# Patient Record
Sex: FEMALE | Race: WHITE | NOT HISPANIC OR LATINO | Employment: FULL TIME | URBAN - METROPOLITAN AREA
[De-identification: names, ages, dates, MRNs, and addresses within clinical notes are randomized per-mention and may not be internally consistent; named-entity substitution may affect disease eponyms.]

---

## 2020-02-12 ENCOUNTER — OFFICE VISIT (OUTPATIENT)
Dept: URGENT CARE | Facility: CLINIC | Age: 30
End: 2020-02-12
Payer: COMMERCIAL

## 2020-02-12 VITALS
DIASTOLIC BLOOD PRESSURE: 67 MMHG | BODY MASS INDEX: 21.66 KG/M2 | WEIGHT: 122.25 LBS | HEART RATE: 96 BPM | TEMPERATURE: 102 F | SYSTOLIC BLOOD PRESSURE: 116 MMHG | HEIGHT: 63 IN | OXYGEN SATURATION: 97 %

## 2020-02-12 DIAGNOSIS — J98.8 BACTERIAL RESPIRATORY INFECTION: Primary | ICD-10-CM

## 2020-02-12 DIAGNOSIS — R50.9 FEVER, UNSPECIFIED FEVER CAUSE: ICD-10-CM

## 2020-02-12 DIAGNOSIS — R05.9 COUGH: ICD-10-CM

## 2020-02-12 DIAGNOSIS — B96.89 BACTERIAL RESPIRATORY INFECTION: Primary | ICD-10-CM

## 2020-02-12 LAB
CTP QC/QA: YES
FLUAV AG NPH QL: NEGATIVE
FLUBV AG NPH QL: NEGATIVE

## 2020-02-12 PROCEDURE — 87804 INFLUENZA ASSAY W/OPTIC: CPT | Mod: QW,S$GLB,, | Performed by: NURSE PRACTITIONER

## 2020-02-12 PROCEDURE — 87804 POCT INFLUENZA A/B: ICD-10-PCS | Mod: QW,S$GLB,, | Performed by: NURSE PRACTITIONER

## 2020-02-12 PROCEDURE — 99214 OFFICE O/P EST MOD 30 MIN: CPT | Mod: 25,S$GLB,, | Performed by: NURSE PRACTITIONER

## 2020-02-12 PROCEDURE — 99214 PR OFFICE/OUTPT VISIT, EST, LEVL IV, 30-39 MIN: ICD-10-PCS | Mod: 25,S$GLB,, | Performed by: NURSE PRACTITIONER

## 2020-02-12 RX ORDER — GUAIFENESIN 600 MG/1
600 TABLET, EXTENDED RELEASE ORAL 2 TIMES DAILY PRN
Qty: 14 TABLET | Refills: 0 | Status: SHIPPED | OUTPATIENT
Start: 2020-02-12 | End: 2020-02-19

## 2020-02-12 RX ORDER — PROMETHAZINE HYDROCHLORIDE AND DEXTROMETHORPHAN HYDROBROMIDE 6.25; 15 MG/5ML; MG/5ML
5 SYRUP ORAL NIGHTLY PRN
Qty: 50 ML | Refills: 0 | Status: SHIPPED | OUTPATIENT
Start: 2020-02-12 | End: 2020-02-22

## 2020-02-12 RX ORDER — LEVOFLOXACIN 750 MG/1
750 TABLET ORAL DAILY
Qty: 7 TABLET | Refills: 0 | Status: SHIPPED | OUTPATIENT
Start: 2020-02-12 | End: 2020-02-19

## 2020-02-12 RX ORDER — ACETAMINOPHEN 500 MG
1000 TABLET ORAL
Status: COMPLETED | OUTPATIENT
Start: 2020-02-12 | End: 2020-02-12

## 2020-02-12 RX ADMIN — Medication 1000 MG: at 05:02

## 2020-02-12 NOTE — LETTER
February 12, 2020      [facility]  [address]  [facility] [address]  Phone: [phone]       Patient: Deana Sanchez   YOB: 1990  Date of Visit: 02/12/2020    To Whom It May Concern:    Juan Carlos Sanchez  was at [facility] on 02/12/2020. She may return to work on 02/14/2020 with no restrictions. If you have any questions or concerns, or if I can be of further assistance, please do not hesitate to contact me.    Sincerely,    Yuliya Oneil, NP

## 2020-02-12 NOTE — PATIENT INSTRUCTIONS
Preventing Common Respiratory Infections  Respiratory infections such as colds and influenza (the flu) are common in winter. These infections are often caused by viruses. They may share some symptoms, but not all respiratory infections are the same. Some make you more sick than others. You can take steps to prevent common respiratory infections. And if you get sick, you can take care of yourself to keep the infection from getting worse.    What is a cold?  · Symptoms include runny nose, coughing and sneezing, and sore throat. Cold symptoms tend to be milder than flu symptoms.  · Symptoms tend to come on slowly. They last for a few days to about a week.  · With a cold, you can still do most of the things you usually do.  What is the flu?  · Symptoms include fever, headache, fatigue, cough, sore throat, runny nose, and muscle aches. Children may have upset stomach and vomiting, but adults usually dont.  · Symptoms tend to come on quickly. Some, such as fatigue and cough, can last a few weeks.  · With the flu, you may feel worn out and not able to do normal activities.  · Its most likely NOT the flu if an adult has vomiting or diarrhea for a day or two. This so-called stomach flu is probably a GI (gastrointestinal) infection.  When the infection gets worse  Without proper care, a respiratory infection can get worse. It can lead to serious complications and death. If you arent getting better, call your healthcare provider. Complications can include:  · Bronchitis (infection of the airways that leads to shortness of breath and coughing up thick yellow or green mucus)  · Pneumonia (infection of the lungs in which fluid and mucus settle in the lungs, making breathing difficult)  · Worsening of chronic conditions such as heart failure, chronic lung disease, asthma, or diabetes  · Severe dehydration (loss of fluids)  · Sinus problems  · Ear infections   Get a flu vaccine  A flu vaccine protects you from influenza  (but not other colds or infections). Get a vaccine each fall, before flu season starts. This can be done at a clinic, healthcare providers office, drugstore, senior center, or through your workplace.  Get pneumococcal vaccines  Pneumonia can be a complication of influenza. There are 2 pneumococcal pneumonia vaccines that protect against many types of pneumonia. Talk with your healthcare provider about these important vaccines.   Keep germs from spreading  No one likes getting sick. To protect yourself and others from cold and flu germs:  · Wash your hands often. Use alcohol-based hand  when you dont have access to soap and water.  · Dont touch your eyes, nose, and mouth. This may help you keep germs out of your body.  · Try to avoid people with respiratory infections. You may want to stay out of crowds during flu season (winter).  · Ask your healthcare provider if you should get a pneumonia vaccination.  How to wash your hands  · Use warm water and plenty of soap. Work up a good lather.  · Clean your whole hand, under your nails, between your fingers, and up your wrists. Wash for at least 15 to 20 seconds. Dont just wipe--rub well.  · Rinse. Let the water run down your fingertips, not up your wrists.  · In a public restroom, use a paper towel to turn off the faucet and open the door.   Date Last Reviewed: 12/1/2016  © 0847-0421 [employer]. [address] [place] [address], [place] [address]. All rights reserved. This information is not intended as a substitute for professional medical care. Always follow your healthcare professional's instructions.

## 2020-02-12 NOTE — PROGRESS NOTES
"Subjective:       Patient ID: Deana Sanchez is a 29 y.o. female.    Vitals:  height is 5' 3" (1.6 m) and weight is 55.4 kg (122 lb 3.9 oz). Her oral temperature is 101.7 °F (38.7 °C) (abnormal). Her blood pressure is 116/67 and her pulse is 96. Her oxygen saturation is 97%.     Chief Complaint: URI    URI    This is a new problem. The current episode started in the past 7 days. The problem has been gradually worsening. The maximum temperature recorded prior to her arrival was 102 - 102.9 F. The fever has been present for less than 1 day. Associated symptoms include congestion, coughing, a plugged ear sensation, sinus pain, a sore throat, swollen glands and wheezing. Pertinent negatives include no ear pain, nausea, rash or vomiting. She has tried NSAIDs for the symptoms. The treatment provided mild relief.       Constitution: Positive for chills, sweating, fatigue and fever.   HENT: Positive for congestion, sinus pain, sinus pressure, sore throat and voice change. Negative for ear pain.    Neck: Positive for painful lymph nodes.   Eyes: Negative for eye redness.   Respiratory: Positive for chest tightness, cough, sputum production, shortness of breath and wheezing. Negative for bloody sputum, COPD, stridor and asthma.    Gastrointestinal: Negative for nausea and vomiting.   Musculoskeletal: Positive for muscle ache.   Skin: Negative for rash.   Allergic/Immunologic: Negative for seasonal allergies and asthma.   Hematologic/Lymphatic: Positive for swollen lymph nodes.       Objective:      Physical Exam   Constitutional: She is oriented to person, place, and time. She appears well-developed and well-nourished. She is cooperative.  Non-toxic appearance. She does not have a sickly appearance. She does not appear ill. No distress.   HENT:   Head: Normocephalic and atraumatic.   Right Ear: Hearing, tympanic membrane, external ear and ear canal normal.   Left Ear: Hearing, tympanic membrane, external ear and ear " canal normal.   Nose: Nose normal. No mucosal edema, rhinorrhea or nasal deformity. No epistaxis. Right sinus exhibits no maxillary sinus tenderness and no frontal sinus tenderness. Left sinus exhibits no maxillary sinus tenderness and no frontal sinus tenderness.   Mouth/Throat: Uvula is midline and mucous membranes are normal. No trismus in the jaw. Normal dentition. No uvula swelling. Posterior oropharyngeal erythema (mild) present. No oropharyngeal exudate or posterior oropharyngeal edema.   Eyes: Conjunctivae and lids are normal. No scleral icterus.   Neck: Trachea normal, full passive range of motion without pain and phonation normal. Neck supple. No neck rigidity. No edema and no erythema present.   Cardiovascular: Normal rate, regular rhythm, normal heart sounds, intact distal pulses and normal pulses.   Pulmonary/Chest: Effort normal. No accessory muscle usage. No tachypnea and no bradypnea. No respiratory distress. She has no decreased breath sounds. She has rhonchi in the right upper field and the left upper field.   NP Cough, moderate congestion noted   Abdominal: Normal appearance.   Musculoskeletal: Normal range of motion. She exhibits no edema or deformity.   Lymphadenopathy:        Head (right side): No submandibular and no tonsillar adenopathy present.        Head (left side): No submandibular and no tonsillar adenopathy present.   Neurological: She is alert and oriented to person, place, and time. She exhibits normal muscle tone. Coordination normal.   Skin: Skin is warm, dry, intact, not diaphoretic, not pale and no rash. Capillary refill takes less than 2 seconds.   Psychiatric: She has a normal mood and affect. Her speech is normal and behavior is normal. Judgment and thought content normal. Cognition and memory are normal.   Nursing note and vitals reviewed.        Assessment:       1. Bacterial respiratory infection    2. Fever, unspecified fever cause    3. Cough        Plan:          Bacterial respiratory infection  -     levoFLOXacin (LEVAQUIN) 750 MG tablet; Take 1 tablet (750 mg total) by mouth once daily. for 7 days  Dispense: 7 tablet; Refill: 0  -     guaiFENesin (MUCINEX) 600 mg 12 hr tablet; Take 1 tablet (600 mg total) by mouth 2 (two) times daily as needed.  Dispense: 14 tablet; Refill: 0  -     promethazine-dextromethorphan (PROMETHAZINE-DM) 6.25-15 mg/5 mL Syrp; Take 5 mLs by mouth nightly as needed.  Dispense: 50 mL; Refill: 0    Fever, unspecified fever cause  -     POCT Influenza A/B  -     acetaminophen tablet 1,000 mg    Cough  -     promethazine-dextromethorphan (PROMETHAZINE-DM) 6.25-15 mg/5 mL Syrp; Take 5 mLs by mouth nightly as needed.  Dispense: 50 mL; Refill: 0         Results for orders placed or performed in visit on 02/12/20   POCT Influenza A/B   Result Value Ref Range    Rapid Influenza A Ag Negative Negative    Rapid Influenza B Ag Negative Negative     Acceptable Yes        Take antibiotics for entire course.  Do not save medications for later, all medications must be taken for full regimen.  · Getting plenty of rest is very important to fighting infections.  · Increase fluids.   · May apply warm compresses as needed for facial pain and congestion.   · Saline nasal spray to loosen nasal congestion.  · Flonase or Nasacort to reduce inflammation in the sinus cavities.  · You may take an over the counter antihistamine for allergy symptoms such as sneezing, itchy/watery eyes, scratchy throat, or congestion.  · Take Tylenol or Ibuprofen as needed for sore throat, body aches, or fever.  · Don't drive, drink alcohol, or take any other medication or substance that causes sedation while taking cough syrup.   · Follow up with your primary care provider if symptoms persist >10 days or sooner for any new or worsening symptoms.   Go to the ER for any fever that does not improve with Tylenol/Ibuprofen, neck stiffness, rash, severe headache, vision changes,  shortness of breath, chest pain, facial swelling, severe facial pain, or any other new and concerning symptoms.

## 2020-02-16 ENCOUNTER — TELEPHONE (OUTPATIENT)
Dept: URGENT CARE | Facility: CLINIC | Age: 30
End: 2020-02-16

## 2021-01-29 ENCOUNTER — OFFICE VISIT (OUTPATIENT)
Dept: URGENT CARE | Facility: CLINIC | Age: 31
End: 2021-01-29
Payer: COMMERCIAL

## 2021-01-29 VITALS
HEART RATE: 80 BPM | OXYGEN SATURATION: 98 % | TEMPERATURE: 98 F | DIASTOLIC BLOOD PRESSURE: 74 MMHG | BODY MASS INDEX: 21.34 KG/M2 | HEIGHT: 64 IN | SYSTOLIC BLOOD PRESSURE: 113 MMHG | WEIGHT: 125 LBS | RESPIRATION RATE: 18 BRPM

## 2021-01-29 DIAGNOSIS — R10.9 ABDOMINAL PAIN, UNSPECIFIED ABDOMINAL LOCATION: ICD-10-CM

## 2021-01-29 DIAGNOSIS — N94.6 DYSMENORRHEA: Primary | ICD-10-CM

## 2021-01-29 LAB
B-HCG UR QL: NEGATIVE
BILIRUB UR QL STRIP: NEGATIVE
CTP QC/QA: YES
GLUCOSE UR QL STRIP: NEGATIVE
KETONES UR QL STRIP: NEGATIVE
LEUKOCYTE ESTERASE UR QL STRIP: NEGATIVE
PH, POC UA: 5
POC BLOOD, URINE: POSITIVE
POC NITRATES, URINE: NEGATIVE
PROT UR QL STRIP: NEGATIVE
SP GR UR STRIP: 1.02 (ref 1–1.03)
UROBILINOGEN UR STRIP-ACNC: ABNORMAL (ref 0.1–1.1)

## 2021-01-29 PROCEDURE — 81003 POCT URINALYSIS, DIPSTICK, AUTOMATED, W/O SCOPE: ICD-10-PCS | Mod: QW,S$GLB,, | Performed by: EMERGENCY MEDICINE

## 2021-01-29 PROCEDURE — 99213 OFFICE O/P EST LOW 20 MIN: CPT | Mod: 25,S$GLB,, | Performed by: EMERGENCY MEDICINE

## 2021-01-29 PROCEDURE — 99213 PR OFFICE/OUTPT VISIT, EST, LEVL III, 20-29 MIN: ICD-10-PCS | Mod: 25,S$GLB,, | Performed by: EMERGENCY MEDICINE

## 2021-01-29 PROCEDURE — 3008F BODY MASS INDEX DOCD: CPT | Mod: CPTII,S$GLB,, | Performed by: EMERGENCY MEDICINE

## 2021-01-29 PROCEDURE — 3008F PR BODY MASS INDEX (BMI) DOCUMENTED: ICD-10-PCS | Mod: CPTII,S$GLB,, | Performed by: EMERGENCY MEDICINE

## 2021-01-29 PROCEDURE — 81003 URINALYSIS AUTO W/O SCOPE: CPT | Mod: QW,S$GLB,, | Performed by: EMERGENCY MEDICINE

## 2021-03-17 ENCOUNTER — OFFICE VISIT (OUTPATIENT)
Dept: URGENT CARE | Facility: CLINIC | Age: 31
End: 2021-03-17
Payer: COMMERCIAL

## 2021-03-17 VITALS
RESPIRATION RATE: 18 BRPM | TEMPERATURE: 98 F | DIASTOLIC BLOOD PRESSURE: 66 MMHG | HEART RATE: 105 BPM | OXYGEN SATURATION: 99 % | SYSTOLIC BLOOD PRESSURE: 105 MMHG

## 2021-03-17 DIAGNOSIS — J06.9 UPPER RESPIRATORY TRACT INFECTION, UNSPECIFIED TYPE: Primary | ICD-10-CM

## 2021-03-17 LAB
CTP QC/QA: YES
SARS-COV-2 RDRP RESP QL NAA+PROBE: NEGATIVE

## 2021-03-17 PROCEDURE — 99213 PR OFFICE/OUTPT VISIT, EST, LEVL III, 20-29 MIN: ICD-10-PCS | Mod: S$GLB,,, | Performed by: PHYSICIAN ASSISTANT

## 2021-03-17 PROCEDURE — U0002: ICD-10-PCS | Mod: QW,S$GLB,, | Performed by: PHYSICIAN ASSISTANT

## 2021-03-17 PROCEDURE — U0002 COVID-19 LAB TEST NON-CDC: HCPCS | Mod: QW,S$GLB,, | Performed by: PHYSICIAN ASSISTANT

## 2021-03-17 PROCEDURE — 99213 OFFICE O/P EST LOW 20 MIN: CPT | Mod: S$GLB,,, | Performed by: PHYSICIAN ASSISTANT

## 2021-03-20 ENCOUNTER — TELEPHONE (OUTPATIENT)
Dept: URGENT CARE | Facility: CLINIC | Age: 31
End: 2021-03-20

## 2021-04-28 ENCOUNTER — PATIENT MESSAGE (OUTPATIENT)
Dept: RESEARCH | Facility: HOSPITAL | Age: 31
End: 2021-04-28

## 2022-03-02 ENCOUNTER — OFFICE VISIT (OUTPATIENT)
Dept: URGENT CARE | Facility: CLINIC | Age: 32
End: 2022-03-02
Payer: COMMERCIAL

## 2022-03-02 VITALS
RESPIRATION RATE: 18 BRPM | OXYGEN SATURATION: 100 % | WEIGHT: 116 LBS | BODY MASS INDEX: 20.55 KG/M2 | HEIGHT: 63 IN | DIASTOLIC BLOOD PRESSURE: 63 MMHG | HEART RATE: 104 BPM | TEMPERATURE: 99 F | SYSTOLIC BLOOD PRESSURE: 105 MMHG

## 2022-03-02 DIAGNOSIS — R09.81 NASAL CONGESTION: ICD-10-CM

## 2022-03-02 DIAGNOSIS — J30.9 ALLERGIC RHINITIS, UNSPECIFIED SEASONALITY, UNSPECIFIED TRIGGER: Primary | ICD-10-CM

## 2022-03-02 LAB
CTP QC/QA: YES
SARS-COV-2 RDRP RESP QL NAA+PROBE: NEGATIVE

## 2022-03-02 PROCEDURE — 1160F RVW MEDS BY RX/DR IN RCRD: CPT | Mod: CPTII,S$GLB,, | Performed by: EMERGENCY MEDICINE

## 2022-03-02 PROCEDURE — 1160F PR REVIEW ALL MEDS BY PRESCRIBER/CLIN PHARMACIST DOCUMENTED: ICD-10-PCS | Mod: CPTII,S$GLB,, | Performed by: EMERGENCY MEDICINE

## 2022-03-02 PROCEDURE — 3078F PR MOST RECENT DIASTOLIC BLOOD PRESSURE < 80 MM HG: ICD-10-PCS | Mod: CPTII,S$GLB,, | Performed by: EMERGENCY MEDICINE

## 2022-03-02 PROCEDURE — 1159F PR MEDICATION LIST DOCUMENTED IN MEDICAL RECORD: ICD-10-PCS | Mod: CPTII,S$GLB,, | Performed by: EMERGENCY MEDICINE

## 2022-03-02 PROCEDURE — 3074F SYST BP LT 130 MM HG: CPT | Mod: CPTII,S$GLB,, | Performed by: EMERGENCY MEDICINE

## 2022-03-02 PROCEDURE — 3008F BODY MASS INDEX DOCD: CPT | Mod: CPTII,S$GLB,, | Performed by: EMERGENCY MEDICINE

## 2022-03-02 PROCEDURE — 3074F PR MOST RECENT SYSTOLIC BLOOD PRESSURE < 130 MM HG: ICD-10-PCS | Mod: CPTII,S$GLB,, | Performed by: EMERGENCY MEDICINE

## 2022-03-02 PROCEDURE — 3008F PR BODY MASS INDEX (BMI) DOCUMENTED: ICD-10-PCS | Mod: CPTII,S$GLB,, | Performed by: EMERGENCY MEDICINE

## 2022-03-02 PROCEDURE — 99212 OFFICE O/P EST SF 10 MIN: CPT | Mod: S$GLB,,, | Performed by: EMERGENCY MEDICINE

## 2022-03-02 PROCEDURE — 3078F DIAST BP <80 MM HG: CPT | Mod: CPTII,S$GLB,, | Performed by: EMERGENCY MEDICINE

## 2022-03-02 PROCEDURE — U0002 COVID-19 LAB TEST NON-CDC: HCPCS | Mod: QW,S$GLB,, | Performed by: EMERGENCY MEDICINE

## 2022-03-02 PROCEDURE — U0002: ICD-10-PCS | Mod: QW,S$GLB,, | Performed by: EMERGENCY MEDICINE

## 2022-03-02 PROCEDURE — 1159F MED LIST DOCD IN RCRD: CPT | Mod: CPTII,S$GLB,, | Performed by: EMERGENCY MEDICINE

## 2022-03-02 PROCEDURE — 99212 PR OFFICE/OUTPT VISIT, EST, LEVL II, 10-19 MIN: ICD-10-PCS | Mod: S$GLB,,, | Performed by: EMERGENCY MEDICINE

## 2022-03-02 RX ORDER — FLUTICASONE PROPIONATE 50 MCG
2 SPRAY, SUSPENSION (ML) NASAL DAILY
Qty: 16 G | Refills: 0 | Status: SHIPPED | OUTPATIENT
Start: 2022-03-02

## 2022-03-02 NOTE — LETTER
March 2, 2022      [facility]  [address]  [place] [address]  Phone: [phone]       Patient: Deana Sanchez   YOB: 1990  Date of Visit: 03/02/2022    To Whom It May Concern:    Juan Carlos Sanchez  was at [facility] on 03/02/2022. The patient may return to work/school on 03/03/2022 with no restrictions. If you have any questions or concerns, or if I can be of further assistance, please do not hesitate to contact me.    Sincerely,      Kiana Dobbins MD

## 2022-03-02 NOTE — PROGRESS NOTES
"Subjective:       Patient ID: Deana Sanchez is a 31 y.o. female.    Vitals:  height is 5' 3" (1.6 m) and weight is 52.6 kg (116 lb). Her temperature is 98.5 °F (36.9 °C). Her blood pressure is 105/63 and her pulse is 104. Her respiration is 18 and oxygen saturation is 100%.     Chief Complaint: Nasal Congestion    Pt presents today for nasal congestion and sore throat that started last night. Pt took Mucinex but it doesn't relieve symptoms. Pt denies exposure to any illness.  No fever chills.  Did cut the grass yesterday and thinks this might have precipitated it.    URI   This is a new problem. The current episode started yesterday. The problem has been gradually worsening. There has been no fever. Associated symptoms include congestion, a plugged ear sensation, rhinorrhea, sneezing, a sore throat and swollen glands. Pertinent negatives include no abdominal pain, chest pain, coughing, diarrhea, dysuria, ear pain, headaches, joint pain, joint swelling, nausea, neck pain, rash, sinus pain, vomiting or wheezing. Treatments tried: mucinex. The treatment provided no relief.       HENT: Positive for congestion and sore throat. Negative for ear pain and sinus pain.    Neck: Negative for neck pain.   Cardiovascular: Negative for chest pain.   Respiratory: Negative for cough and wheezing.    Gastrointestinal: Negative for abdominal pain, nausea, vomiting and diarrhea.   Genitourinary: Negative for dysuria.   Skin: Negative for rash.   Allergic/Immunologic: Positive for sneezing.   Neurological: Negative for headaches.       Objective:      Physical Exam   Constitutional: She is oriented to person, place, and time. She appears well-developed. She is cooperative.  Non-toxic appearance. She does not appear ill. No distress.   HENT:   Head: Normocephalic and atraumatic.   Ears:   Right Ear: Hearing and external ear normal.   Left Ear: Hearing and external ear normal.   Nose: Congestion present. No mucosal edema, " rhinorrhea or nasal deformity. No epistaxis. Right sinus exhibits no maxillary sinus tenderness and no frontal sinus tenderness. Left sinus exhibits no maxillary sinus tenderness and no frontal sinus tenderness.   Mouth/Throat: Uvula is midline and mucous membranes are normal. No trismus in the jaw. Normal dentition. No uvula swelling. Posterior oropharyngeal erythema present. No oropharyngeal exudate or posterior oropharyngeal edema.   Eyes: Conjunctivae and lids are normal. No scleral icterus.      extraocular movement intact   Neck: Trachea normal and phonation normal. Neck supple. No edema present. No erythema present. No neck rigidity present.   Cardiovascular: Normal rate, regular rhythm, normal heart sounds and normal pulses.   Pulmonary/Chest: Effort normal and breath sounds normal. No respiratory distress. She has no decreased breath sounds. She has no rhonchi.   Abdominal: Normal appearance.   Musculoskeletal: Normal range of motion.         General: No deformity. Normal range of motion.   Neurological: She is alert and oriented to person, place, and time. She exhibits normal muscle tone. Coordination normal.   Skin: Skin is warm, dry, intact, not diaphoretic and not pale.   Psychiatric: Her speech is normal and behavior is normal. Judgment and thought content normal.   Nursing note and vitals reviewed.        Assessment:       1. Allergic rhinitis, unspecified seasonality, unspecified trigger    2. Nasal congestion        Results for orders placed or performed in visit on 03/02/22   POCT COVID-19 Rapid Screening   Result Value Ref Range    POC Rapid COVID Negative Negative     Acceptable Yes        Plan:         Allergic rhinitis, unspecified seasonality, unspecified trigger  -     fluticasone propionate (FLONASE) 50 mcg/actuation nasal spray; 2 sprays (100 mcg total) by Each Nostril route once daily.  Dispense: 16 g; Refill: 0    Nasal congestion  -     POCT COVID-19 Rapid Screening

## 2022-03-02 NOTE — PATIENT INSTRUCTIONS
Use over the counter(OTC) antihistamine like claritin or zyrtec daily.  Flonase: 2 sprays per nostril 1-2 times a day.   Nasal saline drops: 2-4 drops per nostril 10-15 times a day.     Tylenol or Motrin for fever or pain per label instructions.  Consider use of OTC cough medicine like Robitussin DM.  Avoid OTC decongestants if you have high blood pressure. This includes multi-cold symptom preparations.    Follow up in 2-3 days with PCP if no improvement or any worsening.     Seasonal Allergies Discharge Instructions   About this topic   Seasonal allergies are also called allergic rhinitis or hay fever. You may have sneezing; a stuffy or runny nose; or itchy throat, ears, or eyes. You may feel very tired. Most often, this problem is caused by:  Pollens from trees, grasses, or weeds.  Mold spores that grow when the air is humid, wet, or damp.  Some people can breathe in these things and have no problems. But when you have a seasonal allergy, your body acts as if the substance is harming you. Then you have symptoms. You may have symptoms only at some times of the year. If your symptoms last all year, they may be caused by:  Insects, such as dust mites or cock roaches.  Animals, such as cats or dogs.  Mold spores.     What care is needed at home?   Ask your doctor what you need to do when you go home. Make sure you ask questions if you do not understand what the doctor says.  Rinse your nose with salt water to get rid of pollen inside of your nose.  Keep the windows closed and use air conditioning.  Shower before bed to rinse pollen from your hair and skin.  Wear a dust mask if you need to be outside.  Use over-the-counter medicines to help with your symptoms:  A steroid nose spray can help with a stuffy nose. It can also help with drainage down the back of your throat.  An antihistamine can help with itching, sneezing, or runny nose.  An antihistamine eye drop can help with itchy eyes.  A decongestant can help with a  stuffy nose. Talk with your doctor or pharmacist about your other health problems before you use this kind of medicine. It may not be safe for people with high blood pressure.  What follow-up care is needed?   Your doctor may ask you to make visits to the office to check on your progress. Your doctor may ask you to see an allergy specialist, or to have testing done to learn what is causing your allergies. Be sure to keep these visits.  What drugs may be needed?   The doctor may order drugs to treat the signs. Take your drugs as ordered. You may also take allergy shots if you have had allergy tests.  Will physical activity be limited?   You may have to limit your activity. If your health problem is caused by an allergy to pollens or molds, you may want to limit your time outdoors. Talk to your doctor about what activities are best for you.  What problems could happen?   Your signs may not get better with your drugs  Asthma may get worse  Sinus infection  What can be done to prevent this health problem?   Try to avoid allergens.  If you are allergic to pollens, grasses, trees, etc:  Stay inside in the morning when pollen counts are high.  Avoid going outside when the trees, grasses, or weeds are blooming.  Keep the windows of your house and car closed.  Use an air conditioner.  If you are allergic to dust, molds, dust mites, pets, etc:  Clean your air conditioner or furnace filters regularly.  Cover pillows and mattresses with vinyl covers to lower your exposure to dust mites.  Wash bedding weekly in very hot water.  Use fewer dust-collecting items, such as curtains, bed skirts, carpeting, and stuffed animals, mainly in your bedroom.  If you can't avoid having a furry pet, vacuum often and keep your pet out of bedrooms and other rooms with carpets.  When do I need to call the doctor?   You are having so much trouble breathing that you can only say one or two words at a time.  You need to sit upright at all times to be  able to breathe and or cannot lie down.  You have severe swelling of your tongue, lips, or mouth.  You have trouble breathing when talking or sitting still.  You have a fever of 100.4°F (38°C) or higher.  You have green or yellow mucus.  Teach Back: Helping You Understand   The Teach Back Method helps you understand the information we are giving you. After you talk with the staff, tell them in your own words what you learned. This helps to make sure the staff has described each thing clearly. It also helps to explain things that may have been confusing. Before going home, make sure you can do these:  I can tell you about my condition.  I can tell you what may help make the air .  I can tell you what may help ease my allergies.  Where can I learn more?   Food and Drug Administration  https://www.fda.gov/ForConsumers/ConsumerUpdates/zmi151675.htm   NHS Choices  https://www.nhs.uk/conditions/Hay-fever/   Last Reviewed Date   2021-06-21  Consumer Information Use and Disclaimer   This information is not specific medical advice and does not replace information you receive from your health care provider. This is only a brief summary of general information. It does NOT include all information about conditions, illnesses, injuries, tests, procedures, treatments, therapies, discharge instructions or life-style choices that may apply to you. You must talk with your health care provider for complete information about your health and treatment options. This information should not be used to decide whether or not to accept your health care providers advice, instructions or recommendations. Only your health care provider has the knowledge and training to provide advice that is right for you.  Copyright   Copyright © 2021 [employer] and its affiliates and/or licensors. All rights reserved.

## 2022-03-05 ENCOUNTER — TELEPHONE (OUTPATIENT)
Dept: URGENT CARE | Facility: CLINIC | Age: 32
End: 2022-03-05
Payer: COMMERCIAL

## 2022-10-07 ENCOUNTER — TELEPHONE (OUTPATIENT)
Dept: HEMATOLOGY/ONCOLOGY | Facility: CLINIC | Age: 32
End: 2022-10-07
Payer: COMMERCIAL

## 2022-10-13 ENCOUNTER — TELEPHONE (OUTPATIENT)
Dept: HEMATOLOGY/ONCOLOGY | Facility: CLINIC | Age: 32
End: 2022-10-13
Payer: COMMERCIAL

## 2022-10-13 NOTE — NURSING
1620pm: Outgoing call to pt regarding incorrect appt scheduled via my [facility] portal per pt. Spoke with pt. Explained to pt why appt was cx'd and needs to be rescheduled. Pt scheduled for new pt appt on 11/1 at 7 am at [facility]. Appt date and time per pt request. Pt given location instructions. I confirmed appt type [facility] via phone prior to scheduling. Pt verbalized understanding. Pt plan to report to appt as scheduled.

## 2022-10-18 NOTE — PROGRESS NOTES
Patient ID: Deana Sanchez is a 32 y.o. female.    Chief Complaint: jonathan breast masses    HPI: patient presents for f/u evaluation of bilateral breast masses- last visit was 6/21/16    Onset- bilateral masses for years- thinks the one one on the left has increased in size over the past year - not sure if a new one or the same and is especially painful prior to cycle  Past history of lumps- for years jonathan solid masses    Last imaging- 6/2016    Risk factors identified:     Menarche at 16  G 0 P 0    LMP: 10/10/2022  Estrogen: none  Radiation to the neck or chest wall- none  Prior breast biopsies or atypical hyperplasia- none    ETOH: monthly     FH: paternal aunt breast cancer at 63 y/o      Body mass index is 20.74 kg/m².    Review of Systems   Constitutional: Negative.    HENT: Negative.     Eyes: Negative.    Respiratory: Negative.     Cardiovascular: Negative.    Gastrointestinal: Negative.         No reflux   Endocrine: Negative.    Genitourinary: Negative.    Musculoskeletal: Negative.    Skin: Negative.    Allergic/Immunologic: Negative.    Neurological: Negative.    Hematological: Negative.  Negative for adenopathy.   Psychiatric/Behavioral: Negative.     Breast: Pt states has noted right breast mass increasing in size and more sensitive around cycles. No nipple discharge. Bilateral breast masses as noted in HPI. No prior trauma or bruising. No breast surgeries or abnormalities.    Current Outpatient Medications   Medication Sig Dispense Refill    fluticasone propionate (FLONASE) 50 mcg/actuation nasal spray 2 sprays (100 mcg total) by Each Nostril route once daily. 16 g 0     No current facility-administered medications for this visit.       Review of patient's allergies indicates:   Allergen Reactions    Morphine Other (See Comments)     aggression       No past medical history on file.    Past Surgical History:   Procedure Laterality Date    ANKLE SURGERY      KNEE SURGERY      MANDIBLE SURGERY          No family history on file.    Social History     Socioeconomic History    Marital status: Single   Tobacco Use    Smoking status: Some Days     Packs/day: 0.50     Types: Cigarettes    Smokeless tobacco: Never    Tobacco comments:     working on quitting   Substance and Sexual Activity    Alcohol use: Yes     Alcohol/week: 1.0 standard drink     Types: 1 Standard drinks or equivalent per week    Drug use: No       Vitals:    11/01/22 0717   BP: 114/76   Pulse: 93       Physical Exam  Constitutional:       Appearance: She is well-developed.   HENT:      Head: Normocephalic and atraumatic.      Right Ear: External ear normal.      Left Ear: External ear normal.      Mouth/Throat:      Pharynx: No oropharyngeal exudate.   Eyes:      General: No scleral icterus.        Right eye: No discharge.         Left eye: No discharge.      Conjunctiva/sclera: Conjunctivae normal.      Pupils: Pupils are equal, round, and reactive to light.   Neck:      Thyroid: No thyromegaly.   Cardiovascular:      Rate and Rhythm: Normal rate and regular rhythm.      Heart sounds: Normal heart sounds.   Pulmonary:      Effort: Pulmonary effort is normal.      Breath sounds: Normal breath sounds.   Chest:   Breasts:     Right: No inverted nipple, mass, nipple discharge, skin change or tenderness.      Left: No inverted nipple, mass, nipple discharge, skin change or tenderness.       Abdominal:      General: Bowel sounds are normal.      Palpations: Abdomen is soft.   Musculoskeletal:         General: Normal range of motion.      Right shoulder: No crepitus. Normal strength.      Cervical back: Normal range of motion and neck supple.   Lymphadenopathy:      Head:      Right side of head: No submental, submandibular, tonsillar, preauricular, posterior auricular or occipital adenopathy.      Left side of head: No submental, submandibular, tonsillar, preauricular, posterior auricular or occipital adenopathy.      Cervical: No cervical  adenopathy.      Right cervical: No superficial or posterior cervical adenopathy.     Left cervical: No superficial or posterior cervical adenopathy.      Upper Body:      Right upper body: No supraclavicular or axillary adenopathy.      Left upper body: No supraclavicular or axillary adenopathy.   Skin:     General: Skin is warm and dry.      Coloration: Skin is not pale.      Findings: No erythema or rash.   Neurological:      Mental Status: She is alert and oriented to person, place, and time.      Deep Tendon Reflexes: Reflexes are normal and symmetric.   Psychiatric:         Behavior: Behavior normal.         Thought Content: Thought content normal.         Judgment: Judgment normal.         6/14/2016  US Breast Bilateral Limited  Films Compared   The present examination has been compared to prior imaging studies   performed at [facility] on 12/08/2015 and 12/17/2015.  BILATERAL LIMITED ULTRASOUND FINDINGS:   Finding 1:  There is a solid mass measuring 16 millimeters seen in the left   breast at 4 o'clock. Ultrasound is suggestive of a 4 o'clock.  2 similar   appearing findings at 6 o'clock and 4 o'clock measuring 6 mm in diameter   which are also probably fibroadenomas.     Finding 2:  There are hyperechoic areas seen in the sub-areolar region of   the right breast. Ultrasound is suggestive of two solid masses.  They   measure 24 x 6 x 12 mm and 13 x 3 x 8 mm. Also probably fibroadenomas.  Impression   Finding 1:  Stable 4 o'clock in the left breast is probably benign.   Follow-up in 6 months is recommended.     Finding 2:  Stable solid masses in the right breast are probably benign.   Follow-up in 6 months is recommended.    ARTEMIO NEWMAN  06/14/2016  US Breast Left Limited  LEFT LIMITED ULTRASOUND FINDINGS:   High-resolution real-time ultrasound scanning was performed.     There is a hypoechoic area seen in the left breast at 4 o'clock. Ultrasound   is suggestive of a solid mass.  Probable  fibroadenoma.  There is an   adjacent simple cyst at 4:00.  Impression   Solid mass in the left breast is probably benign. Follow-up in 6 months is   recommended. Any decision to biopsy should be made on a clinical basis.    LULU CHILDERS., TJ  12/17/2015  US Breast Right Limited  Films Compared   No prior imaging studies are available for comparison.  RIGHT LIMITED ULTRASOUND FINDINGS:   There are hypoechoic areas seen in the sub-areolar region of the right   breast. Ultrasound is suggestive of two solid masses.  24mm and 11mm in   diameter.     Appearance would be consistent with fibroadenomata.  Impression   Solid masses in the right breast are probably benign. Follow-up in 6 months   is recommended.    ARTEMIO NEWMAN  12/08/2015       Assessment & Plan:  Mass of breast, unspecified laterality  -     Mammo Digital Diagnostic Bilat with Aman; Future; Expected date: 12/01/2022  -     US Breast Bilateral Limited; Future; Expected date: 12/01/2022    Encounter for breast cancer screening using non-mammogram modality    Health education/counseling    Counseling and coordination of care    Counseling on health promotion and disease prevention    clinical findings on exam- right breast palpable mass- new- left breast stable findings  imaging- last done 6/2016- over due for f/u- will schedule jonathan diagnostic mammo and jonathan ultrasound now to reassess the bilateral breast masses  If new mass and over 3 cm right breast pt would like to have removed due to size and pain. Would need an ultrasound guided biopsy and then excision with Surgeon.   Will forward pt results and recommendations via [employer] pt agrees- schedule dfor 11/10/2022 at 2 pm

## 2022-11-01 ENCOUNTER — OFFICE VISIT (OUTPATIENT)
Dept: SURGERY | Facility: CLINIC | Age: 32
End: 2022-11-01
Payer: COMMERCIAL

## 2022-11-01 VITALS
HEART RATE: 93 BPM | WEIGHT: 117.06 LBS | BODY MASS INDEX: 20.74 KG/M2 | SYSTOLIC BLOOD PRESSURE: 114 MMHG | DIASTOLIC BLOOD PRESSURE: 76 MMHG

## 2022-11-01 DIAGNOSIS — Z12.39 ENCOUNTER FOR BREAST CANCER SCREENING USING NON-MAMMOGRAM MODALITY: ICD-10-CM

## 2022-11-01 DIAGNOSIS — N63.0 MASS OF BREAST, UNSPECIFIED LATERALITY: Primary | ICD-10-CM

## 2022-11-01 DIAGNOSIS — Z71.89 COUNSELING AND COORDINATION OF CARE: ICD-10-CM

## 2022-11-01 DIAGNOSIS — Z71.89 COUNSELING ON HEALTH PROMOTION AND DISEASE PREVENTION: ICD-10-CM

## 2022-11-01 DIAGNOSIS — Z71.9 HEALTH EDUCATION/COUNSELING: ICD-10-CM

## 2022-11-01 PROCEDURE — 1160F RVW MEDS BY RX/DR IN RCRD: CPT | Mod: CPTII,S$GLB,, | Performed by: NURSE PRACTITIONER

## 2022-11-01 PROCEDURE — 3074F SYST BP LT 130 MM HG: CPT | Mod: CPTII,S$GLB,, | Performed by: NURSE PRACTITIONER

## 2022-11-01 PROCEDURE — 3078F DIAST BP <80 MM HG: CPT | Mod: CPTII,S$GLB,, | Performed by: NURSE PRACTITIONER

## 2022-11-01 PROCEDURE — 99999 PR PBB SHADOW E&M-EST. PATIENT-LVL III: CPT | Mod: PBBFAC,,, | Performed by: NURSE PRACTITIONER

## 2022-11-01 PROCEDURE — 1159F PR MEDICATION LIST DOCUMENTED IN MEDICAL RECORD: ICD-10-PCS | Mod: CPTII,S$GLB,, | Performed by: NURSE PRACTITIONER

## 2022-11-01 PROCEDURE — 3074F PR MOST RECENT SYSTOLIC BLOOD PRESSURE < 130 MM HG: ICD-10-PCS | Mod: CPTII,S$GLB,, | Performed by: NURSE PRACTITIONER

## 2022-11-01 PROCEDURE — 99999 PR PBB SHADOW E&M-EST. PATIENT-LVL III: ICD-10-PCS | Mod: PBBFAC,,, | Performed by: NURSE PRACTITIONER

## 2022-11-01 PROCEDURE — 1159F MED LIST DOCD IN RCRD: CPT | Mod: CPTII,S$GLB,, | Performed by: NURSE PRACTITIONER

## 2022-11-01 PROCEDURE — 3078F PR MOST RECENT DIASTOLIC BLOOD PRESSURE < 80 MM HG: ICD-10-PCS | Mod: CPTII,S$GLB,, | Performed by: NURSE PRACTITIONER

## 2022-11-01 PROCEDURE — 99203 PR OFFICE/OUTPT VISIT, NEW, LEVL III, 30-44 MIN: ICD-10-PCS | Mod: S$GLB,,, | Performed by: NURSE PRACTITIONER

## 2022-11-01 PROCEDURE — 1160F PR REVIEW ALL MEDS BY PRESCRIBER/CLIN PHARMACIST DOCUMENTED: ICD-10-PCS | Mod: CPTII,S$GLB,, | Performed by: NURSE PRACTITIONER

## 2022-11-01 PROCEDURE — 99203 OFFICE O/P NEW LOW 30 MIN: CPT | Mod: S$GLB,,, | Performed by: NURSE PRACTITIONER

## 2022-11-10 ENCOUNTER — HOSPITAL ENCOUNTER (OUTPATIENT)
Dept: RADIOLOGY | Facility: HOSPITAL | Age: 32
Discharge: HOME | End: 2022-11-10
Attending: NURSE PRACTITIONER
Payer: COMMERCIAL

## 2022-11-10 VITALS — HEIGHT: 63 IN | BODY MASS INDEX: 20.74 KG/M2

## 2022-11-10 DIAGNOSIS — R92.8 ABNORMAL MAMMOGRAM: Primary | ICD-10-CM

## 2022-11-10 DIAGNOSIS — N63.0 MASS OF BREAST, UNSPECIFIED LATERALITY: ICD-10-CM

## 2022-11-10 DIAGNOSIS — N63.11 MASS OF UPPER OUTER QUADRANT OF RIGHT BREAST: ICD-10-CM

## 2022-11-10 PROCEDURE — 76642 US BREAST BILATERAL LIMITED: ICD-10-PCS | Mod: 26,,, | Performed by: RADIOLOGY

## 2022-11-10 PROCEDURE — 77062 BREAST TOMOSYNTHESIS BI: CPT | Mod: 26,,, | Performed by: RADIOLOGY

## 2022-11-10 PROCEDURE — 77066 MAMMO DIGITAL DIAGNOSTIC BILAT WITH TOMO: ICD-10-PCS | Mod: 26,,, | Performed by: RADIOLOGY

## 2022-11-10 PROCEDURE — 77062 MAMMO DIGITAL DIAGNOSTIC BILAT WITH TOMO: ICD-10-PCS | Mod: 26,,, | Performed by: RADIOLOGY

## 2022-11-10 PROCEDURE — 77066 DX MAMMO INCL CAD BI: CPT | Mod: 26,,, | Performed by: RADIOLOGY

## 2022-11-10 PROCEDURE — 76642 ULTRASOUND BREAST LIMITED: CPT | Mod: TC,50

## 2022-11-10 PROCEDURE — 76642 ULTRASOUND BREAST LIMITED: CPT | Mod: 26,,, | Performed by: RADIOLOGY

## 2022-11-10 PROCEDURE — 77062 BREAST TOMOSYNTHESIS BI: CPT | Mod: TC

## 2022-11-11 ENCOUNTER — TELEPHONE (OUTPATIENT)
Dept: RADIOLOGY | Facility: HOSPITAL | Age: 32
End: 2022-11-11
Payer: COMMERCIAL

## 2022-11-11 NOTE — TELEPHONE ENCOUNTER
Ultrasound guided biopsy scheduled for November 22 at 10am, arrival time 9:30, at the [place], biopsy instruction given with understandings verbalized. Patient has my contact information.

## 2022-11-22 ENCOUNTER — HOSPITAL ENCOUNTER (OUTPATIENT)
Dept: RADIOLOGY | Facility: HOSPITAL | Age: 32
Discharge: HOME | End: 2022-11-22
Attending: NURSE PRACTITIONER
Payer: COMMERCIAL

## 2022-11-22 DIAGNOSIS — N63.11 MASS OF UPPER OUTER QUADRANT OF RIGHT BREAST: ICD-10-CM

## 2022-11-22 DIAGNOSIS — R92.8 ABNORMAL MAMMOGRAM: ICD-10-CM

## 2022-11-22 PROCEDURE — 88305 TISSUE EXAM BY PATHOLOGIST: CPT | Performed by: PATHOLOGY

## 2022-11-22 PROCEDURE — A4550 SURGICAL TRAYS: HCPCS

## 2022-11-22 PROCEDURE — 88305 TISSUE EXAM BY PATHOLOGIST: CPT | Mod: 26,,, | Performed by: PATHOLOGY

## 2022-11-22 PROCEDURE — 77065 DX MAMMO INCL CAD UNI: CPT | Mod: TC,RT

## 2022-11-22 PROCEDURE — 77065 DX MAMMO INCL CAD UNI: CPT | Mod: 26,RT,, | Performed by: RADIOLOGY

## 2022-11-22 PROCEDURE — 19083 BX BREAST 1ST LESION US IMAG: CPT | Mod: RT,,, | Performed by: RADIOLOGY

## 2022-11-22 PROCEDURE — 88305 TISSUE EXAM BY PATHOLOGIST: ICD-10-PCS | Mod: 26,,, | Performed by: PATHOLOGY

## 2022-11-22 PROCEDURE — 77065 MAMMO DIGITAL DIAGNOSTIC RIGHT: ICD-10-PCS | Mod: 26,RT,, | Performed by: RADIOLOGY

## 2022-11-22 PROCEDURE — 19083 US BREAST BIOPSY WITH IMAGING 1ST SITE RIGHT: ICD-10-PCS | Mod: RT,,, | Performed by: RADIOLOGY

## 2022-11-28 LAB
COMMENT: NORMAL
FINAL PATHOLOGIC DIAGNOSIS: NORMAL
GROSS: NORMAL
Lab: NORMAL

## 2022-11-29 ENCOUNTER — TELEPHONE (OUTPATIENT)
Dept: SURGERY | Facility: CLINIC | Age: 32
End: 2022-11-29
Payer: COMMERCIAL

## 2022-12-01 ENCOUNTER — OFFICE VISIT (OUTPATIENT)
Dept: SURGERY | Facility: CLINIC | Age: 32
End: 2022-12-01
Payer: COMMERCIAL

## 2022-12-01 DIAGNOSIS — D24.2 FIBROADENOMA OF BOTH BREASTS: ICD-10-CM

## 2022-12-01 DIAGNOSIS — N63.15 UNSPECIFIED LUMP IN THE RIGHT BREAST, OVERLAPPING QUADRANTS: Primary | ICD-10-CM

## 2022-12-01 DIAGNOSIS — D24.1 FIBROADENOMA OF BOTH BREASTS: ICD-10-CM

## 2022-12-01 PROCEDURE — 1159F PR MEDICATION LIST DOCUMENTED IN MEDICAL RECORD: ICD-10-PCS | Mod: CPTII,S$GLB,, | Performed by: SURGERY

## 2022-12-01 PROCEDURE — 99999 PR PBB SHADOW E&M-EST. PATIENT-LVL III: ICD-10-PCS | Mod: PBBFAC,,, | Performed by: SURGERY

## 2022-12-01 PROCEDURE — 99999 PR PBB SHADOW E&M-EST. PATIENT-LVL III: CPT | Mod: PBBFAC,,, | Performed by: SURGERY

## 2022-12-01 PROCEDURE — 1159F MED LIST DOCD IN RCRD: CPT | Mod: CPTII,S$GLB,, | Performed by: SURGERY

## 2022-12-01 PROCEDURE — 99204 OFFICE O/P NEW MOD 45 MIN: CPT | Mod: S$GLB,,, | Performed by: SURGERY

## 2022-12-01 PROCEDURE — 99204 PR OFFICE/OUTPT VISIT, NEW, LEVL IV, 45-59 MIN: ICD-10-PCS | Mod: S$GLB,,, | Performed by: SURGERY

## 2022-12-01 RX ORDER — SODIUM CHLORIDE 9 MG/ML
INJECTION, SOLUTION INTRAVENOUS CONTINUOUS
Status: CANCELLED | OUTPATIENT
Start: 2022-12-01

## 2022-12-01 NOTE — PROGRESS NOTES
Breast Surgical Oncology  [place]    Date of Service: 2022    SUBJECTIVE:   Chief complaint: enlarging right breast mass    HISTORY OF PRESENT ILLNESS:   Deana Sanchez is a 32 y.o. female who is kindly referred by Dr. Isidro Acosta for an enlarging right breast mass.    She has had bilateral breast masses for several years with the radiologic appearance of small fibroadenomas which have been stable since 2016. However, recent diagnostic imaging revealed a new right breast mass at 9:00 5 CMFN measuring 3.5 cm in maximal dimension. The mass causes pain during her menstrual cycles. She is asymptomatic from the remaining bilateral breast masses. She denies breast concerns such as skin changes, nipple discharge, nipple retraction or lumps under the arm.     Her breast cancer risk factor profile is as follows: Menarche at 16, Menopause at N/A.  She is . Family history of cancer is as follows: paternal Aunt with breast cancer at age 58; currently living.    FAMILY HISTORY:     Family History   Problem Relation Age of Onset    Breast cancer Maternal Aunt         PAST MEDICAL HISTORY:   History reviewed. No pertinent past medical history.    SURGICAL HISTORY:     Past Surgical History:   Procedure Laterality Date    ANKLE SURGERY      KNEE SURGERY      MANDIBLE SURGERY         SOCIAL HISTORY:     Social History     Tobacco Use    Smoking status: Some Days     Packs/day: 0.50     Types: Cigarettes    Smokeless tobacco: Never    Tobacco comments:     working on quitting   Substance Use Topics    Alcohol use: Yes     Alcohol/week: 1.0 standard drink     Types: 1 Standard drinks or equivalent per week    Drug use: No      Reports 2 packs per week tobacco use    MEDICATIONS/ALLERGIES:     Current Outpatient Medications:     fluticasone propionate (FLONASE) 50 mcg/actuation nasal spray, 2 sprays (100 mcg total) by Each Nostril route once daily., Disp: 16 g, Rfl: 0  Review of patient's allergies  indicates:   Allergen Reactions    Morphine Other (See Comments)     aggression       REVIEW OF SYSTEMS:   I have reviewed 12 systems, including 2 points per system. Pertinent reported positives are: none    PHYSICAL EXAM:   General: The patient appears well and is in no acute distress.     Chaperon present for examination.   BREAST EXAM  No Asymmetry  Right:  - Mass: yes, 4 cm mobile mass 9:00 5 CMFN, 2 cm mobile mass SA position  - Skin change: yes, post biopsy ecchymosis at 9:00  - Nipple Discharge: No  - Nipple retraction: No  - Axillary LAD: No  Left:   - Mass: yes, lower outer quadrant 1 cm mobile mass , vague nodularity at 4:00 a.m. position 6 cm from the nipple  - Skin change: No  - Nipple Discharge: No  - Nipple retraction: No  - Axillary LAD: No    IMAGING:   Images were personally reviewed.   Results for orders placed during the hospital encounter of 11/10/22    Mammo Digital Diagnostic Bilat with Aman    Narrative  Result:  Mammo Digital Diagnostic Bilat with [facility]man  US Breast Bilateral Limited    History:  Patient is 32 y.o. and is seen for diagnostic imaging.    Films Compared:  Compared to: 06/14/2016 US Breast Bilateral Limited, 12/17/2015 US Breast Left Limited, and 12/08/2015 US Breast Right Limited    Findings:  This procedure was performed using tomosynthesis. Computer-aided detection was utilized in the interpretation of this examination.  The breasts are heterogeneously dense, which may obscure small masses.    Left  Mammo Digital Diagnostic Bilat with Aman  There are 2 similar masses seen in the lower outer quadrant of the left breast.    There is no evidence of suspicious masses, calcifications, or other abnormal findings in the left breast.    US Breast Bilateral Limited  There is an 8 mm x 10 mm x 4 mm oval, parallel, hypoechoic mass seen in the lower outer quadrant of the left breast. Additional parallel hypoechoic lesion at 06:00 o'clock measuring 10 mm x 4 mm x 12 mm.  Both these lesions  are most consistent with fibroadenomas and are stable when compared to the study from 2016.    There is no evidence of suspicious masses or other abnormal findings in the left breast.    Right  Mammo Digital Diagnostic Bilat with [facility]man  There is a mass seen in the outer region of the right breast.    There are 2 similar masses seen in the right breast.    US Breast Bilateral Limited  There is a 35 mm x 17 mm x 33 mm oval, parallel, hypoechoic mass with posterior enhancement seen in the right breast at 9 o'clock. Finding most likely representative of a fibroadenoma although due to the size tissue sampling versus surgical resection is recommended.  This lesion was not imaged on prior studies from 2016.  No axillary adenopathy.    There is a 24 mm x 5 mm x 10 mm mass seen in the retroareolar region of the right breast. Additional subareolar mass measures 14 x 6 by 11 mm.  Both of these lesions are stable when compared to 2016 and are most consistent with fibroadenomas.    Impression  Left  Mammo Digital Diagnostic Bilat with Aman  There is no mammographic evidence of malignancy in the left breast.    US Breast Bilateral Limited  There is no sonographic evidence of malignancy in the left breast.    Right  Mass: Right breast mass at the outer position. Assessment: 4A - Suspicious finding. Ultrasound-guided biopsy is recommended.    BI-RADS Category:  Overall: 4 - Suspicious      Recommendation:    Ultrasound-guided biopsy is recommended.        Your estimated lifetime risk of breast cancer (to age 85) based on Tyrer-Cuzick risk assessment model is Tyrer-Cuzick: 11.68 %. According to the American Cancer Society, patients with a lifetime breast cancer risk of 20% or higher might benefit from supplemental screening tests.      PATHOLOGY:     Lab Results   Component Value Date    [facility]  11/22/2022     1. Right breast mass (9 o'clock position), biopsy:      -  Benign fibroadenoma      -  Negative for atypia or malignancy        ASSESSMENT:     1. Unspecified lump in the right breast, overlapping quadrants    2. Fibroadenoma of both breasts          PLAN:     We discussed fibroepithelial lesions in nature including fibroadenoma and phyllodes tumor. We discussed that fibroadenoma is a benign mass of the breast which does not usually require surgical excision. A phyllodes tumor can have varying severity ranging from benign to malignant and requires surgical excision. A phyllodes tumor can sometimes be mistaken for a fibroadenoma and it is important to determine which fibroadenomas should be surgically excised in order to rule out phyllodes tumor. I recommend removing fibroadenomas that are large (>3cm), rapidly enlarging or pathologic features (such as increased cellularity) which are concerning.     Due to this, I recommend excisional biopsy of the right breast mass at 9:00 5 CMFN. The risks benefits and alternatives to surgery have been discussed.  The risks include bout are not limited to pain, bleeding, infection, scarring, cosmetic deformity, nondiagnostic biopsy and need for other procedures and/or treatments. She has provided informed consent. She will be scheduled at the next available operating room time.     The remaining small fibroadenomas of bilateral breasts have remained stable since 2016.  Due to this they do not need interval surveillance or warrant surgical excision.    I spent a total of 45 minutes on this visit. This includes face to face time and non-face to face time preparing to see the patient (eg, review of tests), obtaining and/or reviewing separately obtained history, documenting clinical information in the electronic or other health record, independently interpreting results and communicating results to the patient/family/caregiver, or care coordinator.        Naheed Patel M.D.

## 2022-12-01 NOTE — H&P (VIEW-ONLY)
Breast Surgical Oncology  [place]    Date of Service: 2022    SUBJECTIVE:   Chief complaint: enlarging right breast mass    HISTORY OF PRESENT ILLNESS:   Deana Sanchez is a 32 y.o. female who is kindly referred by Dr. Isidro Acosta for an enlarging right breast mass.    She has had bilateral breast masses for several years with the radiologic appearance of small fibroadenomas which have been stable since 2016. However, recent diagnostic imaging revealed a new right breast mass at 9:00 5 CMFN measuring 3.5 cm in maximal dimension. The mass causes pain during her menstrual cycles. She is asymptomatic from the remaining bilateral breast masses. She denies breast concerns such as skin changes, nipple discharge, nipple retraction or lumps under the arm.     Her breast cancer risk factor profile is as follows: Menarche at 16, Menopause at N/A.  She is . Family history of cancer is as follows: paternal Aunt with breast cancer at age 58; currently living.    FAMILY HISTORY:     Family History   Problem Relation Age of Onset    Breast cancer Maternal Aunt         PAST MEDICAL HISTORY:   History reviewed. No pertinent past medical history.    SURGICAL HISTORY:     Past Surgical History:   Procedure Laterality Date    ANKLE SURGERY      KNEE SURGERY      MANDIBLE SURGERY         SOCIAL HISTORY:     Social History     Tobacco Use    Smoking status: Some Days     Packs/day: 0.50     Types: Cigarettes    Smokeless tobacco: Never    Tobacco comments:     working on quitting   Substance Use Topics    Alcohol use: Yes     Alcohol/week: 1.0 standard drink     Types: 1 Standard drinks or equivalent per week    Drug use: No      Reports 2 packs per week tobacco use    MEDICATIONS/ALLERGIES:     Current Outpatient Medications:     fluticasone propionate (FLONASE) 50 mcg/actuation nasal spray, 2 sprays (100 mcg total) by Each Nostril route once daily., Disp: 16 g, Rfl: 0  Review of patient's allergies  indicates:   Allergen Reactions    Morphine Other (See Comments)     aggression       REVIEW OF SYSTEMS:   I have reviewed 12 systems, including 2 points per system. Pertinent reported positives are: none    PHYSICAL EXAM:   General: The patient appears well and is in no acute distress.     Chaperon present for examination.   BREAST EXAM  No Asymmetry  Right:  - Mass: yes, 4 cm mobile mass 9:00 5 CMFN, 2 cm mobile mass SA position  - Skin change: yes, post biopsy ecchymosis at 9:00  - Nipple Discharge: No  - Nipple retraction: No  - Axillary LAD: No  Left:   - Mass: yes, lower outer quadrant 1 cm mobile mass , vague nodularity at 4:00 a.m. position 6 cm from the nipple  - Skin change: No  - Nipple Discharge: No  - Nipple retraction: No  - Axillary LAD: No    IMAGING:   Images were personally reviewed.   Results for orders placed during the hospital encounter of 11/10/22    Mammo Digital Diagnostic Bilat with [facility]man    Narrative  Result:  Mammo Digital Diagnostic Bilat with [facility]man  US Breast Bilateral Limited    History:  Patient is 32 y.o. and is seen for diagnostic imaging.    Films Compared:  Compared to: 06/14/2016 US Breast Bilateral Limited, 12/17/2015 US Breast Left Limited, and 12/08/2015 US Breast Right Limited    Findings:  This procedure was performed using tomosynthesis. Computer-aided detection was utilized in the interpretation of this examination.  The breasts are heterogeneously dense, which may obscure small masses.    Left  Mammo Digital Diagnostic Bilat with [facility]  There are 2 similar masses seen in the lower outer quadrant of the left breast.    There is no evidence of suspicious masses, calcifications, or other abnormal findings in the left breast.    US Breast Bilateral Limited  There is an 8 mm x 10 mm x 4 mm oval, parallel, hypoechoic mass seen in the lower outer quadrant of the left breast. Additional parallel hypoechoic lesion at 06:00 o'clock measuring 10 mm x 4 mm x 12 mm.  Both these lesions  are most consistent with fibroadenomas and are stable when compared to the study from 2016.    There is no evidence of suspicious masses or other abnormal findings in the left breast.    Right  Mammo Digital Diagnostic Bilat with [facility]  There is a mass seen in the outer region of the right breast.    There are 2 similar masses seen in the right breast.    US Breast Bilateral Limited  There is a 35 mm x 17 mm x 33 mm oval, parallel, hypoechoic mass with posterior enhancement seen in the right breast at 9 o'clock. Finding most likely representative of a fibroadenoma although due to the size tissue sampling versus surgical resection is recommended.  This lesion was not imaged on prior studies from 2016.  No axillary adenopathy.    There is a 24 mm x 5 mm x 10 mm mass seen in the retroareolar region of the right breast. Additional subareolar mass measures 14 x 6 by 11 mm.  Both of these lesions are stable when compared to 2016 and are most consistent with fibroadenomas.    Impression  Left  Mammo Digital Diagnostic Bilat with [facility]man  There is no mammographic evidence of malignancy in the left breast.    US Breast Bilateral Limited  There is no sonographic evidence of malignancy in the left breast.    Right  Mass: Right breast mass at the outer position. Assessment: 4A - Suspicious finding. Ultrasound-guided biopsy is recommended.    BI-RADS Category:  Overall: 4 - Suspicious      Recommendation:    Ultrasound-guided biopsy is recommended.        Your estimated lifetime risk of breast cancer (to age 85) based on Tyrer-Cuzick risk assessment model is Tyrer-Cuzick: 11.68 %. According to the American Cancer Society, patients with a lifetime breast cancer risk of 20% or higher might benefit from supplemental screening tests.      PATHOLOGY:     Lab Results   Component Value Date    [facility]  11/22/2022     1. Right breast mass (9 o'clock position), biopsy:      -  Benign fibroadenoma      -  Negative for atypia or malignancy        ASSESSMENT:     1. Unspecified lump in the right breast, overlapping quadrants    2. Fibroadenoma of both breasts          PLAN:     We discussed fibroepithelial lesions in nature including fibroadenoma and phyllodes tumor. We discussed that fibroadenoma is a benign mass of the breast which does not usually require surgical excision. A phyllodes tumor can have varying severity ranging from benign to malignant and requires surgical excision. A phyllodes tumor can sometimes be mistaken for a fibroadenoma and it is important to determine which fibroadenomas should be surgically excised in order to rule out phyllodes tumor. I recommend removing fibroadenomas that are large (>3cm), rapidly enlarging or pathologic features (such as increased cellularity) which are concerning.     Due to this, I recommend excisional biopsy of the right breast mass at 9:00 5 CMFN. The risks benefits and alternatives to surgery have been discussed.  The risks include bout are not limited to pain, bleeding, infection, scarring, cosmetic deformity, nondiagnostic biopsy and need for other procedures and/or treatments. She has provided informed consent. She will be scheduled at the next available operating room time.     The remaining small fibroadenomas of bilateral breasts have remained stable since 2016.  Due to this they do not need interval surveillance or warrant surgical excision.    I spent a total of 45 minutes on this visit. This includes face to face time and non-face to face time preparing to see the patient (eg, review of tests), obtaining and/or reviewing separately obtained history, documenting clinical information in the electronic or other health record, independently interpreting results and communicating results to the patient/family/caregiver, or care coordinator.        Naheed Patel M.D.

## 2022-12-02 ENCOUNTER — LAB VISIT (OUTPATIENT)
Dept: LAB | Facility: HOSPITAL | Age: 32
End: 2022-12-02
Attending: SURGERY
Payer: COMMERCIAL

## 2022-12-02 DIAGNOSIS — N63.15 UNSPECIFIED LUMP IN THE RIGHT BREAST, OVERLAPPING QUADRANTS: ICD-10-CM

## 2022-12-02 PROCEDURE — 85025 COMPLETE CBC W/AUTO DIFF WBC: CPT | Performed by: SURGERY

## 2022-12-02 PROCEDURE — 36415 COLL VENOUS BLD VENIPUNCTURE: CPT | Performed by: SURGERY

## 2022-12-02 PROCEDURE — 80048 BASIC METABOLIC PNL TOTAL CA: CPT | Performed by: SURGERY

## 2022-12-03 LAB
ANION GAP SERPL CALC-SCNC: 11 MMOL/L (ref 8–16)
BASOPHILS # BLD AUTO: 0.04 K/UL (ref 0–0.2)
BASOPHILS NFR BLD: 0.7 % (ref 0–1.9)
BUN SERPL-MCNC: 9 MG/DL (ref 6–20)
CALCIUM SERPL-MCNC: 9.3 MG/DL (ref 8.7–10.5)
CHLORIDE SERPL-SCNC: 107 MMOL/L (ref 95–110)
CO2 SERPL-SCNC: 23 MMOL/L (ref 23–29)
CREAT SERPL-MCNC: 0.6 MG/DL (ref 0.5–1.4)
DIFFERENTIAL METHOD: ABNORMAL
EOSINOPHIL # BLD AUTO: 0.1 K/UL (ref 0–0.5)
EOSINOPHIL NFR BLD: 1.2 % (ref 0–8)
ERYTHROCYTE [DISTWIDTH] IN BLOOD BY AUTOMATED COUNT: 18.9 % (ref 11.5–14.5)
EST. GFR  (NO RACE VARIABLE): >60 ML/MIN/1.73 M^2
GLUCOSE SERPL-MCNC: 89 MG/DL (ref 70–110)
HCT VFR BLD AUTO: 28.5 % (ref 37–48.5)
HGB BLD-MCNC: 7.8 G/DL (ref 12–16)
IMM GRANULOCYTES # BLD AUTO: 0.01 K/UL (ref 0–0.04)
IMM GRANULOCYTES NFR BLD AUTO: 0.2 % (ref 0–0.5)
LYMPHOCYTES # BLD AUTO: 1.8 K/UL (ref 1–4.8)
LYMPHOCYTES NFR BLD: 29.1 % (ref 18–48)
MCH RBC QN AUTO: 18.3 PG (ref 27–31)
MCHC RBC AUTO-ENTMCNC: 27.4 G/DL (ref 32–36)
MCV RBC AUTO: 67 FL (ref 82–98)
MONOCYTES # BLD AUTO: 0.5 K/UL (ref 0.3–1)
MONOCYTES NFR BLD: 8.5 % (ref 4–15)
NEUTROPHILS # BLD AUTO: 3.6 K/UL (ref 1.8–7.7)
NEUTROPHILS NFR BLD: 60.3 % (ref 38–73)
NRBC BLD-RTO: 0 /100 WBC
PLATELET # BLD AUTO: 263 K/UL (ref 150–450)
PMV BLD AUTO: 10.2 FL (ref 9.2–12.9)
POTASSIUM SERPL-SCNC: 4 MMOL/L (ref 3.5–5.1)
RBC # BLD AUTO: 4.26 M/UL (ref 4–5.4)
SODIUM SERPL-SCNC: 141 MMOL/L (ref 136–145)
WBC # BLD AUTO: 6.01 K/UL (ref 3.9–12.7)

## 2022-12-05 ENCOUNTER — ANESTHESIA EVENT (OUTPATIENT)
Dept: SURGERY | Facility: HOSPITAL | Age: 32
End: 2022-12-05
Payer: COMMERCIAL

## 2022-12-05 NOTE — PROGRESS NOTES
Informed Dr. Patel of the Following :I just wanted to see if you had a chance to look at her labs from 12/2? Her H&H is 7.8 & 28.5.  we will speak to Dr. Rivera about it. I just wanted to run this by you. I spoke to the pt. this morning and she denied any exhaustion, SOB and fatigue. Dr. Patel is ok with these labs as well is Dr. Rivera anesthesia. Both Dr. Rivera and Dr. Patel agree to proceed with sx

## 2022-12-05 NOTE — ANESTHESIA PREPROCEDURE EVALUATION
12/05/2022  Deana Sanchez is a 32 y.o., female.      Pre-op Assessment    I have reviewed the Patient Summary Reports.     I have reviewed the Nursing Notes. I have reviewed the NPO Status.   I have reviewed the Medications.     Review of Systems  Anesthesia Hx:  No problems with previous Anesthesia  History of prior surgery of interest to airway management or planning: Previous anesthesia: General Denies Family Hx of Anesthesia complications.   Denies Personal Hx of Anesthesia complications.   Social:  Smoker 1/2 - 1 ppd.   Hematology/Oncology:         -- Anemia:   Cardiovascular:  Cardiovascular Normal     Pulmonary:  Pulmonary Normal    Renal/:  Renal/ Normal     Hepatic/GI:  Hepatic/GI Normal    Neurological:  Neurology Normal    Psych:  Psychiatric Normal           Physical Exam    Airway:  Mallampati: III   Mouth Opening: Small, but > 3cm  TM Distance: Normal  Tongue: Normal  Neck ROM: Normal ROM    Chest/Lungs:  Clear to auscultation, Normal Respiratory Rate    Heart:  Rate: Normal  Rhythm: Regular Rhythm        Anesthesia Plan  Type of Anesthesia, risks & benefits discussed:    Anesthesia Type: Gen Supraglottic Airway  Intra-op Monitoring Plan: Standard ASA Monitors  Post Op Pain Control Plan: multimodal analgesia and IV/PO Opioids PRN  Induction:  IV  Informed Consent: Informed consent signed with the Patient and all parties understand the risks and agree with anesthesia plan.  All questions answered.   ASA Score: 2  Day of Surgery Review of History & Physical: H&P Update referred to the surgeon/provider.    Ready For Surgery From Anesthesia Perspective.     .

## 2022-12-06 ENCOUNTER — ANESTHESIA (OUTPATIENT)
Dept: SURGERY | Facility: HOSPITAL | Age: 32
End: 2022-12-06
Payer: COMMERCIAL

## 2022-12-06 ENCOUNTER — HOSPITAL ENCOUNTER (OUTPATIENT)
Dept: RADIOLOGY | Facility: HOSPITAL | Age: 32
Discharge: HOME | End: 2022-12-06
Attending: SURGERY | Admitting: SURGERY
Payer: COMMERCIAL

## 2022-12-06 ENCOUNTER — HOSPITAL ENCOUNTER (OUTPATIENT)
Facility: HOSPITAL | Age: 32
Discharge: HOME | End: 2022-12-06
Attending: SURGERY | Admitting: SURGERY
Payer: COMMERCIAL

## 2022-12-06 ENCOUNTER — PATIENT MESSAGE (OUTPATIENT)
Dept: SURGERY | Facility: HOSPITAL | Age: 32
End: 2022-12-06
Payer: COMMERCIAL

## 2022-12-06 VITALS
BODY MASS INDEX: 20.84 KG/M2 | OXYGEN SATURATION: 100 % | HEIGHT: 63 IN | TEMPERATURE: 98 F | WEIGHT: 117.63 LBS | RESPIRATION RATE: 19 BRPM | DIASTOLIC BLOOD PRESSURE: 62 MMHG | HEART RATE: 67 BPM | SYSTOLIC BLOOD PRESSURE: 96 MMHG

## 2022-12-06 DIAGNOSIS — N63.15 UNSPECIFIED LUMP IN THE RIGHT BREAST, OVERLAPPING QUADRANTS: ICD-10-CM

## 2022-12-06 LAB
B-HCG UR QL: NEGATIVE
CTP QC/QA: YES

## 2022-12-06 PROCEDURE — 88307 TISSUE EXAM BY PATHOLOGIST: CPT | Performed by: STUDENT IN AN ORGANIZED HEALTH CARE EDUCATION/TRAINING PROGRAM

## 2022-12-06 PROCEDURE — 76098 X-RAY EXAM SURGICAL SPECIMEN: CPT | Mod: TC

## 2022-12-06 PROCEDURE — 63600175 PHARM REV CODE 636 W HCPCS: Performed by: ANESTHESIOLOGY

## 2022-12-06 PROCEDURE — C1894 INTRO/SHEATH, NON-LASER: HCPCS | Performed by: SURGERY

## 2022-12-06 PROCEDURE — 19125 PR EXCISE BREAST LES W XRAY MARKER: ICD-10-PCS | Mod: RT,,, | Performed by: SURGERY

## 2022-12-06 PROCEDURE — 37000008 HC ANESTHESIA 1ST 15 MINUTES: Performed by: SURGERY

## 2022-12-06 PROCEDURE — D9220A PRA ANESTHESIA: ICD-10-PCS | Mod: ANES,,, | Performed by: ANESTHESIOLOGY

## 2022-12-06 PROCEDURE — 71000033 HC RECOVERY, INTIAL HOUR: Performed by: SURGERY

## 2022-12-06 PROCEDURE — 37000009 HC ANESTHESIA EA ADD 15 MINS: Performed by: SURGERY

## 2022-12-06 PROCEDURE — 25000003 PHARM REV CODE 250: Performed by: NURSE ANESTHETIST, CERTIFIED REGISTERED

## 2022-12-06 PROCEDURE — 63600175 PHARM REV CODE 636 W HCPCS: Performed by: NURSE ANESTHETIST, CERTIFIED REGISTERED

## 2022-12-06 PROCEDURE — 19125 EXCISION BREAST LESION: CPT | Mod: RT,,, | Performed by: SURGERY

## 2022-12-06 PROCEDURE — 25000003 PHARM REV CODE 250: Performed by: SURGERY

## 2022-12-06 PROCEDURE — 88307 PR  SURG PATH,LEVEL V: ICD-10-PCS | Mod: 26,,, | Performed by: STUDENT IN AN ORGANIZED HEALTH CARE EDUCATION/TRAINING PROGRAM

## 2022-12-06 PROCEDURE — 36000707: Performed by: SURGERY

## 2022-12-06 PROCEDURE — 19285 PERQ DEV BREAST 1ST US IMAG: CPT | Mod: 51,RT,, | Performed by: SURGERY

## 2022-12-06 PROCEDURE — 76098 X-RAY EXAM SURGICAL SPECIMEN: CPT | Mod: 26,,, | Performed by: SURGERY

## 2022-12-06 PROCEDURE — 88307 TISSUE EXAM BY PATHOLOGIST: CPT | Mod: 26,,, | Performed by: STUDENT IN AN ORGANIZED HEALTH CARE EDUCATION/TRAINING PROGRAM

## 2022-12-06 PROCEDURE — D9220A PRA ANESTHESIA: ICD-10-PCS | Mod: CRNA,,, | Performed by: NURSE ANESTHETIST, CERTIFIED REGISTERED

## 2022-12-06 PROCEDURE — 81025 URINE PREGNANCY TEST: CPT | Performed by: SURGERY

## 2022-12-06 PROCEDURE — 36000706: Performed by: SURGERY

## 2022-12-06 PROCEDURE — C1769 GUIDE WIRE: HCPCS | Performed by: SURGERY

## 2022-12-06 PROCEDURE — 76098 PR  X-RAY EXAM, BREAST SPECIMEN: ICD-10-PCS | Mod: 26,,, | Performed by: SURGERY

## 2022-12-06 PROCEDURE — 71000015 HC POSTOP RECOV 1ST HR: Performed by: SURGERY

## 2022-12-06 PROCEDURE — D9220A PRA ANESTHESIA: Mod: ANES,,, | Performed by: ANESTHESIOLOGY

## 2022-12-06 PROCEDURE — D9220A PRA ANESTHESIA: Mod: CRNA,,, | Performed by: NURSE ANESTHETIST, CERTIFIED REGISTERED

## 2022-12-06 PROCEDURE — 19285 PR PERQ PLCMNT DEV, 1ST LES W/US GUIDANCE: ICD-10-PCS | Mod: 51,RT,, | Performed by: SURGERY

## 2022-12-06 RX ORDER — FENTANYL CITRATE 50 UG/ML
INJECTION, SOLUTION INTRAMUSCULAR; INTRAVENOUS
Status: DISCONTINUED | OUTPATIENT
Start: 2022-12-06 | End: 2022-12-06

## 2022-12-06 RX ORDER — LIDOCAINE HYDROCHLORIDE 10 MG/ML
INJECTION, SOLUTION EPIDURAL; INFILTRATION; INTRACAUDAL; PERINEURAL
Status: DISCONTINUED
Start: 2022-12-06 | End: 2022-12-06 | Stop reason: HOSPADM

## 2022-12-06 RX ORDER — ONDANSETRON HYDROCHLORIDE 2 MG/ML
INJECTION, SOLUTION INTRAMUSCULAR; INTRAVENOUS
Status: DISCONTINUED | OUTPATIENT
Start: 2022-12-06 | End: 2022-12-06

## 2022-12-06 RX ORDER — SODIUM CHLORIDE, SODIUM LACTATE, POTASSIUM CHLORIDE, CALCIUM CHLORIDE 600; 310; 30; 20 MG/100ML; MG/100ML; MG/100ML; MG/100ML
INJECTION, SOLUTION INTRAVENOUS CONTINUOUS
Status: ACTIVE | OUTPATIENT
Start: 2022-12-06

## 2022-12-06 RX ORDER — BUPIVACAINE HYDROCHLORIDE 2.5 MG/ML
INJECTION, SOLUTION EPIDURAL; INFILTRATION; INTRACAUDAL
Status: DISCONTINUED | OUTPATIENT
Start: 2022-12-06 | End: 2022-12-06 | Stop reason: HOSPADM

## 2022-12-06 RX ORDER — TRAMADOL HYDROCHLORIDE 50 MG/1
50 TABLET ORAL EVERY 6 HOURS PRN
Qty: 16 TABLET | Refills: 0 | Status: SHIPPED | OUTPATIENT
Start: 2022-12-06

## 2022-12-06 RX ORDER — MEPERIDINE HYDROCHLORIDE 25 MG/ML
12.5 INJECTION INTRAMUSCULAR; INTRAVENOUS; SUBCUTANEOUS ONCE
Status: DISCONTINUED | OUTPATIENT
Start: 2022-12-06 | End: 2022-12-06 | Stop reason: HOSPADM

## 2022-12-06 RX ORDER — MIDAZOLAM HYDROCHLORIDE 1 MG/ML
INJECTION INTRAMUSCULAR; INTRAVENOUS
Status: DISCONTINUED | OUTPATIENT
Start: 2022-12-06 | End: 2022-12-06

## 2022-12-06 RX ORDER — ONDANSETRON 2 MG/ML
4 INJECTION INTRAMUSCULAR; INTRAVENOUS ONCE AS NEEDED
Status: DISCONTINUED | OUTPATIENT
Start: 2022-12-06 | End: 2022-12-06 | Stop reason: HOSPADM

## 2022-12-06 RX ORDER — CEFAZOLIN SODIUM 2 G/50ML
2 SOLUTION INTRAVENOUS
Status: DISCONTINUED | OUTPATIENT
Start: 2022-12-06 | End: 2022-12-06 | Stop reason: HOSPADM

## 2022-12-06 RX ORDER — DEXAMETHASONE SODIUM PHOSPHATE 4 MG/ML
INJECTION, SOLUTION INTRA-ARTICULAR; INTRALESIONAL; INTRAMUSCULAR; INTRAVENOUS; SOFT TISSUE
Status: DISCONTINUED | OUTPATIENT
Start: 2022-12-06 | End: 2022-12-06

## 2022-12-06 RX ORDER — DIPHENHYDRAMINE HYDROCHLORIDE 50 MG/ML
25 INJECTION INTRAMUSCULAR; INTRAVENOUS EVERY 6 HOURS PRN
Status: DISCONTINUED | OUTPATIENT
Start: 2022-12-06 | End: 2022-12-06 | Stop reason: HOSPADM

## 2022-12-06 RX ORDER — PROPOFOL 10 MG/ML
VIAL (ML) INTRAVENOUS
Status: DISCONTINUED | OUTPATIENT
Start: 2022-12-06 | End: 2022-12-06

## 2022-12-06 RX ORDER — ONDANSETRON 8 MG/1
8 TABLET, ORALLY DISINTEGRATING ORAL EVERY 8 HOURS PRN
Qty: 12 TABLET | Refills: 0 | Status: SHIPPED | OUTPATIENT
Start: 2022-12-06

## 2022-12-06 RX ORDER — ALBUTEROL SULFATE 0.83 MG/ML
2.5 SOLUTION RESPIRATORY (INHALATION) EVERY 4 HOURS PRN
Status: DISCONTINUED | OUTPATIENT
Start: 2022-12-06 | End: 2022-12-06 | Stop reason: HOSPADM

## 2022-12-06 RX ORDER — DEXMEDETOMIDINE HYDROCHLORIDE 100 UG/ML
INJECTION, SOLUTION INTRAVENOUS
Status: DISCONTINUED | OUTPATIENT
Start: 2022-12-06 | End: 2022-12-06

## 2022-12-06 RX ORDER — FENTANYL CITRATE 50 UG/ML
25 INJECTION, SOLUTION INTRAMUSCULAR; INTRAVENOUS EVERY 5 MIN PRN
Status: DISCONTINUED | OUTPATIENT
Start: 2022-12-06 | End: 2022-12-06 | Stop reason: HOSPADM

## 2022-12-06 RX ORDER — BUPIVACAINE HYDROCHLORIDE 2.5 MG/ML
INJECTION, SOLUTION EPIDURAL; INFILTRATION; INTRACAUDAL
Status: DISCONTINUED
Start: 2022-12-06 | End: 2022-12-06 | Stop reason: HOSPADM

## 2022-12-06 RX ORDER — SODIUM CHLORIDE 9 MG/ML
INJECTION, SOLUTION INTRAVENOUS CONTINUOUS
Status: DISCONTINUED | OUTPATIENT
Start: 2022-12-06 | End: 2022-12-06 | Stop reason: HOSPADM

## 2022-12-06 RX ORDER — ACETAMINOPHEN 10 MG/ML
INJECTION, SOLUTION INTRAVENOUS
Status: DISCONTINUED | OUTPATIENT
Start: 2022-12-06 | End: 2022-12-06

## 2022-12-06 RX ORDER — LIDOCAINE HCL/PF 100 MG/5ML
SYRINGE (ML) INTRAVENOUS
Status: DISCONTINUED | OUTPATIENT
Start: 2022-12-06 | End: 2022-12-06

## 2022-12-06 RX ADMIN — ACETAMINOPHEN 1000 MG: 10 INJECTION, SOLUTION INTRAVENOUS at 09:12

## 2022-12-06 RX ADMIN — DEXMEDETOMIDINE HYDROCHLORIDE 4 MCG: 100 INJECTION, SOLUTION INTRAVENOUS at 09:12

## 2022-12-06 RX ADMIN — DEXTROSE 2 G: 50 INJECTION, SOLUTION INTRAVENOUS at 09:12

## 2022-12-06 RX ADMIN — FENTANYL CITRATE 25 MCG: 50 INJECTION, SOLUTION INTRAMUSCULAR; INTRAVENOUS at 09:12

## 2022-12-06 RX ADMIN — MIDAZOLAM HYDROCHLORIDE 2 MG: 1 INJECTION INTRAMUSCULAR; INTRAVENOUS at 09:12

## 2022-12-06 RX ADMIN — SODIUM CHLORIDE, SODIUM LACTATE, POTASSIUM CHLORIDE, AND CALCIUM CHLORIDE: .6; .31; .03; .02 INJECTION, SOLUTION INTRAVENOUS at 07:12

## 2022-12-06 RX ADMIN — DEXAMETHASONE SODIUM PHOSPHATE 4 MG: 4 INJECTION, SOLUTION INTRA-ARTICULAR; INTRALESIONAL; INTRAMUSCULAR; INTRAVENOUS; SOFT TISSUE at 09:12

## 2022-12-06 RX ADMIN — DEXMEDETOMIDINE HYDROCHLORIDE 8 MCG: 100 INJECTION, SOLUTION INTRAVENOUS at 09:12

## 2022-12-06 RX ADMIN — ONDANSETRON HYDROCHLORIDE 4 MG: 2 INJECTION, SOLUTION INTRAMUSCULAR; INTRAVENOUS at 09:12

## 2022-12-06 RX ADMIN — Medication 40 MG: at 09:12

## 2022-12-06 RX ADMIN — PROPOFOL 160 MG: 10 INJECTION, EMULSION INTRAVENOUS at 09:12

## 2022-12-06 NOTE — OP NOTE
Operative Report    Preoperative Diagnosis:   Right Breast Unspecified Lump, Overlapping Quadrants   Abnormal Mammogram    Postoperative Diagnosis:  Same    Procedures Performed:  Right Breast Excisional Biopsy   Intraoperatively Placed Radiographic Wire Under Ultrasound Guidance  Interpretation of Specimen Mammogram    Surgeon: Naheed Patel MD    Anesthesia: General    Drains: None    Estimated Blood Loss: 5 ccs    Complications: None apparent    Disposition: PACU in good condition    Intra-operative Ultrasound Procedure and Findings:   Focused sonography of the  overlapping right upper and lower outer  quadrant of the right breast was performed, identifying the mass and the associated biopsy clip.  The extent of the mass was marked on the skin, in order to guide the extent of dissection.     Specimens:  Right Breast Excisional Biopsy, Short Suture Superior, Long Suture Lateral    Statement of Medical Necessity:  This patient is a 32 year old woman who was recently diagnosed with a right high risk breast lesion.  After undergoing a thorough preoperative evaluation and considering all of her options, she has elected for excisional biopsy.  The risks, benefits and alternatives to surgery have been discussed and she has provided informed consent.     Description of Operative Procedures and Findings:  The patient was identified and transported to the operating room and placed on the operating table.  All pressure points were padded.  General Anesthesia was administered.  Intraoperative ultrasound was performed, as described above.  The patient's right breast and axilla was prepped and draped in the usual sterile fashion.  A time out was performed.    Attention was then turned to the right breast.  The mass was identified with ultrasonography and a wire was placed into the center of the mass for surgical guidance. A curvilinear incision was made using the 15 blade scalpel in the lateral aspect of the breast.  A flap  Patient was counselled regarding triggers for symptoms - avoid caffeine/spicey foods/NO smoking  Elevate HOB 30 degrees - DO NOT lie flat for at least 30 minutes after eating  Take medications as directed  Serial upper endoscopy   anterior to the target was raised using the bovie.  Using a combination of the wire and skin markings made under ultrasound guidance, the target was circumferentially dissected.  The specimen was oriented using a short suture superiorly and long suture laterally.  A specimen mammogram confirmed inclusion of the mass, wire, and the biopsy clip.  I interpreted this image myself.      The wound was irrigated, suctioned dry and hemostasis was obtained.  The breast tissue was re-approximated using interrupted 3-0 vicryl sutures.  The instrument, needle and sponge counts were reported to be correct.  The skin incision was closed in 3 layers using numerous 3-0 vicryl interrupted breast parenchymal sutures, 4-0 running deep dermal sutures, and a running 4-0 monocryl subcuticular suture.  The incision was dressed with surgical glue, fluffs, and a surgical bra.The patient was awoken from anesthesia and transported to the PACU in good condition.  No complications were noted. I was present for and performed the entire operation.

## 2022-12-06 NOTE — TRANSFER OF CARE
"Anesthesia Transfer of Care Note    Patient: Deana Sanchez    Procedure(s) Performed: Procedure(s) (LRB):  EXCISION, MASS, BREAST (Right)  NEEDLE LOCALIZATION (Right)    Patient location: PACU    Anesthesia Type: general    Transport from OR: Transported from OR on room air with adequate spontaneous ventilation    Post pain: adequate analgesia    Post assessment: no apparent anesthetic complications    Post vital signs: stable    Level of consciousness: awake    Nausea/Vomiting: no nausea/vomiting    Complications: none    Transfer of care protocol was followed      Last vitals:   Visit Vitals  /66 (BP Location: Right arm, Patient Position: Sitting)   Pulse 83   Temp 36.3 °C (97.4 °F) (Temporal)   Resp 18   Ht 5' 3" (1.6 m)   Wt 53.4 kg (117 lb 9.9 oz)   SpO2 100%   Breastfeeding No   BMI 20.83 kg/m²     "

## 2022-12-06 NOTE — DISCHARGE INSTRUCTIONS
Nozin Instructions  Goal: the goal of Nozin is to reduce the risk of post-procedural infections by bacteria in the nasal cavity. Think of it as hand  for your nose.    How to use:    1. Shake Nozin bottle well    2. Take a cotton swab and apply 4 drops to one tip    3. Insert cotton tip into one nostril, being sure not to go deeper into nose than tip of the swab.    4. Swab nostril 6 times counterclockwise and 6 times clockwise. Make sure to swab the inside front pocket of the nostril.    5. Take swab out and apply 2 drops to the same cotton tip. Repeat steps 3 and 4 in the other nostril.    Do steps 1-5 twice a day for 7 days.          How to Care for Your Wound After Its Treated With  DERMABOND* Topical Skin Adhesive    DERMABOND* Topical Skin Adhesive (2-octyl cyanoacrylate) is a sterile, liquid skin adhesive  that holds wound edges together. The film will usually remain in place for 5 to 10 days, then  naturally fall off your skin.    The following will answer some of your questions and provide instructions for proper care for your  wound while it is healing:    CHECK WOUND APPEARANCE   Some swelling, redness, and pain are common with all wounds and normally will go away as the  wound heals. If swelling, redness, or pain increases or if the wound feels warm to the touch,  contact a doctor. Also contact a doctor if the wound edges reopen or separate.  REPLACE BANDAGES   If your wound is bandaged, keep the bandage dry.   Replace the dressing daily until the adhesive film has fallen off or if the  bandage should become wet, unless otherwise instructed by your  physician.   When changing the dressing, do not place tape directly over the  DERMABOND adhesive film, because removing the tape later may also  remove the film.  AVOID TOPICAL MEDICATIONS   Do not apply liquid or ointment medications or any other product to your wound while the  DERMABOND adhesive film is in place. These may loosen the film before  your wound is healed.  KEEP WOUND DRY AND PROTECTED   You may occasionally and briefly wet your wound in the shower or bath. Do not soak or scrub  your wound, do not swim, and avoid periods of heavy perspiration until the DERMABOND  adhesive has naturally fallen off. After showering or bathing, gently blot your wound dry with a  soft towel. If a protective dressing is being used, apply a fresh, dry bandage, being sure to keep  the tape off the DERMABOND adhesive film.   Apply a clean, dry bandage over the wound if necessary to protect it.   Protect your wound from injury until the skin has had sufficient time to heal.   Do not scratch, rub, or pick at the DERMABOND adhesive film. This may loosen the film before  your wound is healed.   Do not apply lotions, ointments, or powders on the DERMABOND adhesive film.   Protect the wound from prolonged exposure to sunlight or tanning lamps while the film is in  place.  If you have any questions or concerns about this product, please consult your doctor.

## 2022-12-06 NOTE — INTERVAL H&P NOTE
The patient has been examined and the H&P has been reviewed:    I concur with the findings and no changes have occurred since H&P was written.    Surgery risks, benefits and alternative options discussed and understood by patient/family.          Active Hospital Problems    Diagnosis  POA    *Unspecified lump in the right breast, overlapping quadrants [N63.15]  Yes      Resolved Hospital Problems   No resolved problems to display.

## 2022-12-06 NOTE — PATIENT INSTRUCTIONS
POSTOPERATIVE INSTRUCTIONS FOLLOWING BIOPSY OR LUMPECTOMY     The following are post-operative instructions that will help you to recover from your surgery.  Please read over these instructions carefully and contact us if we can answer any of your questions or concerns.     Dressing/breast binder (surgi-bra)  A surgical bra may be placed around your chest after your surgery.  If you are given the bra, please wear it as close to 24 hours a day as possible until your post-operative clinic appointment.  If the elastic around the bra irritates your skin, you may wear a soft t-shirt underneath the bra.  You may go without wearing the bra long enough to bath, to launder and dry the bra.  If the bra is extremely uncomfortable, you may wear a supportive sports bra instead after 2 days.  You may shower after 2 days.  Do not take a tub bath and do not soak the surgical site. Please do not remove the white strips of tape (steri-strips) that cover your incision.  They will be removed at your clinic visit.     Activity   You should be able to return to your regular activities 2 days after your surgery.  However, do not engage in strenuous activities in which you use your upper body such as:  golf, tennis, aerobics, washing windows, raking the yard, mopping, vacuuming, heavy lifting (e.g children) until you are seen for your follow-up appointment in clinic.     Medication for pain  You may find that over the counter pain medications may be sufficient for your pain.  You will be given a prescription for pain medication for more severe pain.  You should not drive or operate machinery while taking these.  Please take narcotics with food.  Narcotics can cause, or worse, constipation.  You will need to increase your fluid intake, eat high fiber foods (such as fruits and bran) and make sure that you are up and walking. You may need to take an over the counter stool softener for constipation.     After surgery at home  Ultram will be  prescribed to take every 6 hr as needed for breakthrough pain  2.  Wear compressive bra at all times for 2 weeks after surgery.    3.  May apply ice on the breast to help with decreasing pain  4.  Keep wound dry 48 hours after the operation. After this time, you are able to shower. No soaking in baths for 1 week. After this time, you are free to shower or bathe.   5.  No driving if you are taking prescribed Ultram. You can get a DUI on these medications.   6.  Avoid touching the wound or surrounding area as much as possible until your postoperative visit.             Please report the following:  Temperature greater than 101 degrees  Discharge or bad odor from the wound  Excessive bleeding, such as bloody dressing or extreme bruising  Redness at incision and/or drain sites  Swelling or buildup of fluid around incision    Additional information  I will see you approximately 2 weeks following your surgery.  If this follow-up appointment has not been made, please call the office.     If you have any questions or problems, please call my office or my nurse.     Dr. Naheed Patel     [phone]

## 2022-12-06 NOTE — ANESTHESIA POSTPROCEDURE EVALUATION
Anesthesia Post Evaluation    Patient: Deana Sanchez    Procedure(s) Performed: Procedure(s) (LRB):  EXCISION, MASS, BREAST (Right)  NEEDLE LOCALIZATION (Right)    Final Anesthesia Type: general      Patient location during evaluation: PACU  Patient participation: Yes- Able to Participate  Level of consciousness: awake and alert and oriented  Post-procedure vital signs: reviewed and stable  Pain management: adequate  Airway patency: patent    PONV status at discharge: No PONV  Anesthetic complications: no      Cardiovascular status: blood pressure returned to baseline, stable and hemodynamically stable  Respiratory status: unassisted  Hydration status: euvolemic  Follow-up not needed.          Vitals Value Taken Time   BP 96/62 12/06/22 1120   Temp 36.6 °C (97.9 °F) 12/06/22 1120   Pulse 67 12/06/22 1120   Resp 19 12/06/22 1120   SpO2 100 % 12/06/22 1120         Event Time   Out of Recovery 10:58:00         Pain/Lavern Score: Lavern Score: 10 (12/6/2022 11:15 AM)

## 2022-12-06 NOTE — DISCHARGE SUMMARY
[facility]  Discharge Note  Short Stay    Procedure(s) (LRB):  EXCISION, MASS, BREAST (Right)  NEEDLE LOCALIZATION (Right)      OUTCOME: Patient tolerated treatment/procedure well without complication and is now ready for discharge.    DISPOSITION: Home or Self Care    FINAL DIAGNOSIS:  Unspecified lump in the right breast, overlapping quadrants    FOLLOWUP: In clinic    DISCHARGE INSTRUCTIONS:    Discharge Procedure Orders   Diet Adult Regular     Notify your health care provider if you experience any of the following:  temperature >100.4     Notify your health care provider if you experience any of the following:  persistent nausea and vomiting or diarrhea     Notify your health care provider if you experience any of the following:  severe uncontrolled pain     Notify your health care provider if you experience any of the following:  redness, tenderness, or signs of infection (pain, swelling, redness, odor or green/yellow discharge around incision site)     Activity as tolerated        TIME SPENT ON DISCHARGE: 15 minutes

## 2022-12-12 ENCOUNTER — PATIENT MESSAGE (OUTPATIENT)
Dept: SURGERY | Facility: CLINIC | Age: 32
End: 2022-12-12
Payer: COMMERCIAL

## 2022-12-19 LAB
FINAL PATHOLOGIC DIAGNOSIS: NORMAL
GROSS: NORMAL
Lab: NORMAL

## 2022-12-22 ENCOUNTER — OFFICE VISIT (OUTPATIENT)
Dept: SURGERY | Facility: CLINIC | Age: 32
End: 2022-12-22
Payer: COMMERCIAL

## 2022-12-22 DIAGNOSIS — N63.15 UNSPECIFIED LUMP IN THE RIGHT BREAST, OVERLAPPING QUADRANTS: Primary | ICD-10-CM

## 2022-12-22 PROCEDURE — 99024 POSTOP FOLLOW-UP VISIT: CPT | Mod: S$GLB,,, | Performed by: SURGERY

## 2022-12-22 PROCEDURE — 99024 PR POST-OP FOLLOW-UP VISIT: ICD-10-PCS | Mod: S$GLB,,, | Performed by: SURGERY

## 2022-12-22 NOTE — PROGRESS NOTES
Breast Surgical Oncology  Post-operative Visit      REFERRING PHYSICIAN:  No referring provider defined for this encounter.       Isidro Acosta MD    Date of Service: 12/22/2022    DIAGNOSIS:   This is a 32 y.o. 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  Final pathology showed benign fibroadenoma, completely excised    INTERVAL HISTORY:   Deana Sanchez comes in for a post-op check.  She denies fever, chills, chest pain or shortness of breath.  Her pain is well controlled.      MEDICATIONS:     Current Outpatient Medications   Medication Sig Dispense Refill    fluticasone propionate (FLONASE) 50 mcg/actuation nasal spray 2 sprays (100 mcg total) by Each Nostril route once daily. 16 g 0    ondansetron (ZOFRAN-ODT) 8 MG TbDL Take 1 tablet (8 mg total) by mouth every 8 (eight) hours as needed (Nausea). 12 tablet 0    traMADoL (ULTRAM) 50 mg tablet Take 1 tablet (50 mg total) by mouth every 6 (six) hours as needed for Pain. 16 tablet 0     No current facility-administered medications for this visit.     Facility-Administered Medications Ordered in Other Visits   Medication Dose Route Frequency Provider Last Rate Last Admin    lactated ringers infusion   Intravenous Continuous Vidya Rivera  mL/hr at 12/06/22 0922 Rate Change at 12/06/22 0922       ALLERGIES:     Review of patient's allergies indicates:   Allergen Reactions    Morphine Other (See Comments)     aggression       PHYSICAL EXAMINATION:   General:  This is a well appearing female with appropriate speech, affect and gait.     Breast:  right lateral incision clean, dry, and intact    ASSESSMENT:   The patient has had an uneventful postoperative course.    PLAN:   Return in 6 months for a follow up office visit, ultrasound and breast exam on right for active surveillance of her  2.5 cm fibroadenoma and subareolar fibroadenoma    Naheed Patel M.D.

## 2023-02-08 ENCOUNTER — PATIENT MESSAGE (OUTPATIENT)
Dept: SURGERY | Facility: HOSPITAL | Age: 33
End: 2023-02-08
Payer: COMMERCIAL

## 2023-05-11 ENCOUNTER — OFFICE VISIT (OUTPATIENT)
Dept: SURGERY | Facility: CLINIC | Age: 33
End: 2023-05-11
Payer: COMMERCIAL

## 2023-05-11 DIAGNOSIS — N63.15 UNSPECIFIED LUMP IN THE RIGHT BREAST, OVERLAPPING QUADRANTS: Primary | ICD-10-CM

## 2023-05-11 DIAGNOSIS — N63.11 UNSPECIFIED LUMP IN THE RIGHT BREAST, UPPER OUTER QUADRANT: ICD-10-CM

## 2023-05-11 PROCEDURE — 99214 OFFICE O/P EST MOD 30 MIN: CPT | Mod: S$GLB,,, | Performed by: SURGERY

## 2023-05-11 PROCEDURE — 76642 PR US BREAST UNILAT LIMITED: ICD-10-PCS | Mod: RT,S$GLB,, | Performed by: SURGERY

## 2023-05-11 PROCEDURE — 99214 PR OFFICE/OUTPT VISIT, EST, LEVL IV, 30-39 MIN: ICD-10-PCS | Mod: S$GLB,,, | Performed by: SURGERY

## 2023-05-11 PROCEDURE — 76642 ULTRASOUND BREAST LIMITED: CPT | Mod: RT,S$GLB,, | Performed by: SURGERY

## 2023-05-11 NOTE — PROGRESS NOTES
Breast Surgical Oncology  [place]    Date of Service: 2023    SUBJECTIVE:   Chief complaint: enlarging right breast mass    HISTORY OF PRESENT ILLNESS:   Deana Sanchez is a 32 y.o. female who is kindly referred by Dr. Isidro Acosta for an enlarging right breast mass.    22  She has had bilateral breast masses for several years with the radiologic appearance of small fibroadenomas which have been stable since 2016. However, recent diagnostic imaging revealed a new right breast mass at 9:00 5 CMFN measuring 3.5 cm in maximal dimension. The mass causes pain during her menstrual cycles. She is asymptomatic from the remaining bilateral breast masses. She denies breast concerns such as skin changes, nipple discharge, nipple retraction or lumps under the arm.     23  She underwent excisional biopsy of her right breast mass at 9:00, 5 CMFN which was a benign fibroadenoma. She is here today for 6 month surveillance of her remaining fibroadenomas which have been stable since 2016. She reports a new palpable mass in her right breast axillary tail. This was noted a few weeks ago.      Her breast cancer risk factor profile is as follows: Menarche at 16, Menopause at N/A.  She is . Family history of cancer is as follows: paternal Aunt with breast cancer at age 58; currently living.    FAMILY HISTORY:     Family History   Problem Relation Age of Onset    Breast cancer Maternal Aunt         PAST MEDICAL HISTORY:   No past medical history on file.    SURGICAL HISTORY:     Past Surgical History:   Procedure Laterality Date    ANKLE SURGERY      BREAST MASS EXCISION Right 2022    Procedure: EXCISION, MASS, BREAST;  Surgeon: Naheed Patel MD;  Location: [facility] [place];  Service: General;  Laterality: Right;  intraoperative needle localization, need US    KNEE SURGERY      MANDIBLE SURGERY      NEEDLE LOCALIZATION Right 2022    Procedure: NEEDLE LOCALIZATION;  Surgeon: Naheed Patel,  MD;  Location: [facility];  Service: General;  Laterality: Right;  intra-op       SOCIAL HISTORY:     Social History     Tobacco Use    Smoking status: Some Days     Packs/day: 0.50     Types: Cigarettes    Smokeless tobacco: Never    Tobacco comments:     working on quitting   Substance Use Topics    Alcohol use: Yes     Alcohol/week: 1.0 standard drink     Types: 1 Standard drinks or equivalent per week    Drug use: No      Reports 2 packs per week tobacco use    MEDICATIONS/ALLERGIES:     Current Outpatient Medications:     fluticasone propionate (FLONASE) 50 mcg/actuation nasal spray, 2 sprays (100 mcg total) by Each Nostril route once daily., Disp: 16 g, Rfl: 0    ondansetron (ZOFRAN-ODT) 8 MG TbDL, Take 1 tablet (8 mg total) by mouth every 8 (eight) hours as needed (Nausea)., Disp: 12 tablet, Rfl: 0    traMADoL (ULTRAM) 50 mg tablet, Take 1 tablet (50 mg total) by mouth every 6 (six) hours as needed for Pain., Disp: 16 tablet, Rfl: 0  No current facility-administered medications for this visit.    Facility-Administered Medications Ordered in Other Visits:     lactated ringers infusion, , Intravenous, Continuous, Vidya Rivera MD, Last Rate: 200 mL/hr at 12/06/22 0922, Rate Change at 12/06/22 0922  Review of patient's allergies indicates:   Allergen Reactions    Morphine Other (See Comments)     aggression       REVIEW OF SYSTEMS:   I have reviewed 12 systems, including 2 points per system. Pertinent reported positives are: none    PHYSICAL EXAM:   General: The patient appears well and is in no acute distress.     Chaperon present for examination.   BREAST EXAM  No Asymmetry  Right:  - Mass: yes, 2 cm mobile mass SA position; 1 cm mass 10:00, 10 CMFN  - Skin change: No  - Nipple Discharge: No  - Nipple retraction: No  - Axillary LAD: No  Left:   - Mass: No  - Skin change: No  - Nipple Discharge: No  - Nipple retraction: No  - Axillary LAD: No    BREAST ULTRASOUND PROCEDURE    Focused sonography of the upper  outer quadrant of the RIGHT breast was performed in 2 views, radial and antiradial.  A(n) hypoechoic oval lesion with circumscribed borders was noted in the 10:00 10 cm from the nipple position. There was no posterior shadowing. The lesion measured 1.06 x 0.66 x 1.3 cm and is parallel to the skin.  The lesion correlates with the palpable abnormality. The findings are most consistent with a fibroadenoma.  BI-RADS 3. This is a new finding.     Focused sonography of the subareolar of the RIGHT breast was performed in 2 views, radial and antiradial.  A(n) hypoechoic oval lesion with circumscribed borders was noted in the 6:00 1 cm from the nipple position. There was no posterior shadowing. The lesion measured 1.0 x 1.48 x 1.79 cm and is parallel to the skin.  The lesion correlates with the known benign mass. The findings are most consistent with a fibroadenoma.  BI-RADS 2. This has remained stable since 2016.    Focused sonography of the subareolar of the RIGHT breast was performed in 2 views, radial and antiradial.  A(n) hypoechoic oval lesion with circumscribed borders was noted in the subareolar position. There was no posterior shadowing. The lesion measured 1.06 x 0.47 x 0.99 cm and is parallel to the skin.  The lesion correlates with the known benign mass. The findings are most consistent with a fibroadenoma.  BI-RADS 2.    IMAGING:   Images were personally reviewed.     Results for orders placed during the hospital encounter of 11/10/22    Mammo Digital Diagnostic Bilat with Aman    Narrative  Result:  Mammo Digital Diagnostic Bilat with Aman  US Breast Bilateral Limited    History:  Patient is 32 y.o. and is seen for diagnostic imaging.    Films Compared:  Compared to: 06/14/2016 US Breast Bilateral Limited, 12/17/2015 US Breast Left Limited, and 12/08/2015 US Breast Right Limited    Findings:  This procedure was performed using tomosynthesis. Computer-aided detection was utilized in the interpretation of this  examination.  The breasts are heterogeneously dense, which may obscure small masses.    Left  Mammo Digital Diagnostic Bilat with Aman  There are 2 similar masses seen in the lower outer quadrant of the left breast.    There is no evidence of suspicious masses, calcifications, or other abnormal findings in the left breast.    US Breast Bilateral Limited  There is an 8 mm x 10 mm x 4 mm oval, parallel, hypoechoic mass seen in the lower outer quadrant of the left breast. Additional parallel hypoechoic lesion at 06:00 o'clock measuring 10 mm x 4 mm x 12 mm.  Both these lesions are most consistent with fibroadenomas and are stable when compared to the study from 2016.    There is no evidence of suspicious masses or other abnormal findings in the left breast.    Right  Mammo Digital Diagnostic Bilat with Aman  There is a mass seen in the outer region of the right breast.    There are 2 similar masses seen in the right breast.    US Breast Bilateral Limited  There is a 35 mm x 17 mm x 33 mm oval, parallel, hypoechoic mass with posterior enhancement seen in the right breast at 9 o'clock. Finding most likely representative of a fibroadenoma although due to the size tissue sampling versus surgical resection is recommended.  This lesion was not imaged on prior studies from 2016.  No axillary adenopathy.    There is a 24 mm x 5 mm x 10 mm mass seen in the retroareolar region of the right breast. Additional subareolar mass measures 14 x 6 by 11 mm.  Both of these lesions are stable when compared to 2016 and are most consistent with fibroadenomas.    Impression  Left  Mammo Digital Diagnostic Bilat with Aman  There is no mammographic evidence of malignancy in the left breast.    US Breast Bilateral Limited  There is no sonographic evidence of malignancy in the left breast.    Right  Mass: Right breast mass at the outer position. Assessment: 4A - Suspicious finding. Ultrasound-guided biopsy is recommended.    BI-RADS  Category:  Overall: 4 - Suspicious      Recommendation:    Ultrasound-guided biopsy is recommended.        Your estimated lifetime risk of breast cancer (to age 85) based on Tyrer-Cuzick risk assessment model is Tyrer-Cuzick: 11.68 %. According to the [employer], patients with a lifetime breast cancer risk of 20% or higher might benefit from supplemental screening tests.      PATHOLOGY:     Lab Results   Component Value Date    FPATHDX  12/06/2022     Right breast, 9 o'clock position, excisional biopsy:  Benign fibroadenoma, completely excised       ASSESSMENT:     1. Unspecified lump in the right breast, overlapping quadrants          PLAN:     We discussed that fibroadenoma is a benign mass of the breast which does not usually require surgical excision. I recommend removing fibroadenomas that are large (>3cm), rapidly enlarging or pathologic features (such as increased cellularity) which are concerning.     The remaining small fibroadenomas of bilateral breasts have remained stable since 2016.  Due to this they do not need interval surveillance or warrant surgical excision.    I spent a total of 30 minutes on this visit. This includes face to face time and non-face to face time preparing to see the patient (eg, review of tests), obtaining and/or reviewing separately obtained history, documenting clinical information in the electronic or other health record, independently interpreting results and communicating results to the patient/family/caregiver, or care coordinator.      Naheed Patel M.D.

## 2023-09-18 ENCOUNTER — OFFICE VISIT (OUTPATIENT)
Dept: URGENT CARE | Facility: CLINIC | Age: 33
End: 2023-09-18
Payer: COMMERCIAL

## 2023-09-18 ENCOUNTER — HOSPITAL ENCOUNTER (OUTPATIENT)
Dept: RADIOLOGY | Facility: CLINIC | Age: 33
Discharge: HOME | End: 2023-09-18
Attending: PHYSICIAN ASSISTANT
Payer: COMMERCIAL

## 2023-09-18 VITALS
HEIGHT: 66 IN | WEIGHT: 120.38 LBS | RESPIRATION RATE: 18 BRPM | BODY MASS INDEX: 19.35 KG/M2 | HEART RATE: 79 BPM | SYSTOLIC BLOOD PRESSURE: 106 MMHG | DIASTOLIC BLOOD PRESSURE: 59 MMHG | TEMPERATURE: 98 F | OXYGEN SATURATION: 100 %

## 2023-09-18 DIAGNOSIS — R07.89 ATYPICAL CHEST PAIN: Primary | ICD-10-CM

## 2023-09-18 DIAGNOSIS — F41.9 ANXIETY: ICD-10-CM

## 2023-09-18 PROCEDURE — 71046 XR CHEST PA AND LATERAL: ICD-10-PCS | Mod: S$GLB,,, | Performed by: STUDENT IN AN ORGANIZED HEALTH CARE EDUCATION/TRAINING PROGRAM

## 2023-09-18 PROCEDURE — 71046 X-RAY EXAM CHEST 2 VIEWS: CPT | Mod: S$GLB,,, | Performed by: STUDENT IN AN ORGANIZED HEALTH CARE EDUCATION/TRAINING PROGRAM

## 2023-09-18 PROCEDURE — 99213 PR OFFICE/OUTPT VISIT, EST, LEVL III, 20-29 MIN: ICD-10-PCS | Mod: S$GLB,,, | Performed by: PHYSICIAN ASSISTANT

## 2023-09-18 PROCEDURE — 93005 EKG 12-LEAD: ICD-10-PCS | Mod: S$GLB,,, | Performed by: PHYSICIAN ASSISTANT

## 2023-09-18 PROCEDURE — 93005 ELECTROCARDIOGRAM TRACING: CPT | Mod: S$GLB,,, | Performed by: PHYSICIAN ASSISTANT

## 2023-09-18 PROCEDURE — 93010 ELECTROCARDIOGRAM REPORT: CPT | Mod: S$GLB,,, | Performed by: INTERNAL MEDICINE

## 2023-09-18 PROCEDURE — 93010 EKG 12-LEAD: ICD-10-PCS | Mod: S$GLB,,, | Performed by: INTERNAL MEDICINE

## 2023-09-18 PROCEDURE — 99213 OFFICE O/P EST LOW 20 MIN: CPT | Mod: S$GLB,,, | Performed by: PHYSICIAN ASSISTANT

## 2023-09-18 NOTE — PATIENT INSTRUCTIONS
ECG and chest xray look GOOD. Your history is inconsistent with cardiac etiology. Most likely stress related. Make sure you are taking care of yourself and hydrating. The most common causes of non cardiac chest pain in your age group are stress, acid reflux, and muscular causes.     Alarm symptoms are progressively worsening chest pain, especially if associated with exertion, shortness of breath, coughing blood, passing out, palpitations, calf pain/swelling - these symptoms warrant emergent re-evaluation in ER.

## 2023-09-18 NOTE — PROGRESS NOTES
"Subjective:      Patient ID: Deana Sanchez is a 33 y.o. female.    Vitals:  height is 5' 5.87" (1.673 m) and weight is 54.6 kg (120 lb 5.9 oz). Her oral temperature is 98 °F (36.7 °C). Her blood pressure is 106/59 (abnormal) and her pulse is 79. Her respiration is 18 and oxygen saturation is 100%.     Chief Complaint: Chest Pain    34 y/o female presents to clinic with c/o intermittent chest pain since yesterday. Pain is fleeting and substernal, lasts 2-5 seconds and occurs sporadically throughout the day. No SOB, N/V, exertional symptoms, CCC, fever, palpitations, calf pain/swelling, recent travel, recent sx, hx of PE/DVT, or hx of hormone use. Pt is a smoker. Patient hasn't taken anything from symptoms. Patient did state that her dad had a heart attack 4 years ago. Does report recent increased stress. Denies belching, abd pain, or postprandial pain    Chest Pain   This is a new problem. The current episode started yesterday. The onset quality is sudden. The problem occurs intermittently. The problem has been gradually worsening. The pain is present in the epigastric region and substernal region. The pain is at a severity of 10/10. The pain is severe. The quality of the pain is described as tightness and crushing. The pain does not radiate. Pertinent negatives include no abdominal pain, back pain, claudication, cough, diaphoresis, dizziness, exertional chest pressure, fever, headaches, hemoptysis, irregular heartbeat, leg pain, lower extremity edema, malaise/fatigue, nausea, near-syncope, numbness, orthopnea, palpitations, PND, shortness of breath, sputum production, syncope, vomiting or weakness. The pain is aggravated by nothing. She has tried nothing for the symptoms. Risk factors include smoking/tobacco exposure.   Her family medical history is significant for diabetes and hypertension.       Constitution: Negative for sweating and fever.   Cardiovascular:  Positive for chest pain. Negative for " palpitations and passing out.   Respiratory:  Negative for cough, sputum production, bloody sputum and shortness of breath.    Gastrointestinal:  Negative for abdominal pain, nausea and vomiting.   Musculoskeletal:  Negative for back pain.   Neurological:  Negative for dizziness, headaches and numbness.      Objective:     Physical Exam   Constitutional: She is oriented to person, place, and time. She appears well-developed. She is cooperative.  Non-toxic appearance. She does not appear ill. No distress.   HENT:   Head: Normocephalic and atraumatic.   Ears:   Right Ear: Hearing, tympanic membrane, external ear and ear canal normal.   Left Ear: Hearing, tympanic membrane, external ear and ear canal normal.   Nose: Nose normal. No mucosal edema, rhinorrhea or nasal deformity. No epistaxis. Right sinus exhibits no maxillary sinus tenderness and no frontal sinus tenderness. Left sinus exhibits no maxillary sinus tenderness and no frontal sinus tenderness.   Mouth/Throat: Uvula is midline, oropharynx is clear and moist and mucous membranes are normal. Mucous membranes are moist. No trismus in the jaw. Normal dentition. No uvula swelling. No posterior oropharyngeal erythema. Oropharynx is clear.   Eyes: Conjunctivae and lids are normal. Right eye exhibits no discharge. Left eye exhibits no discharge. No scleral icterus.   Neck: Trachea normal and phonation normal. Neck supple.   Cardiovascular: Normal rate, regular rhythm, normal heart sounds and normal pulses.   Pulses:       Radial pulses are 2+ on the right side and 2+ on the left side.        Dorsalis pedis pulses are 2+ on the right side and 2+ on the left side.   Pulmonary/Chest: Effort normal and breath sounds normal. No respiratory distress.   No reproducible pain          Comments: No reproducible pain     Abdominal: Normal appearance and bowel sounds are normal. She exhibits no distension and no mass. Soft. There is no abdominal tenderness. There is no rebound  and no guarding.   Musculoskeletal: Normal range of motion.         General: No deformity. Normal range of motion.   Neurological: She is alert and oriented to person, place, and time. She exhibits normal muscle tone. Coordination normal.   Skin: Skin is warm, dry, intact, not diaphoretic and not pale.   Psychiatric: Her speech is normal and behavior is normal. Judgment and thought content normal.   Nursing note and vitals reviewed.    ECG reviewed and interpreted by myself - NSR, no STEMI. No arrhythmia    CXR reviewed and interpreted by me - no cardiomegaly, fracture, or PTX. No infiltrate.    Assessment:     1. Atypical chest pain    2. Anxiety        Plan:     ECG and CXR WNL. Symptoms atypical for cardiac etiology. Most likely from stress. Given reassurance. Went over ER precautions for alarm symptoms.    Atypical chest pain  -     EKG 12-lead  -     X-Ray Chest PA And Lateral; Future; Expected date: 09/18/2023    Anxiety    Marilee Larose PA-C  [facility]       Patient Instructions   ECG and chest xray look GOOD. Your history is inconsistent with cardiac etiology. Most likely stress related. Make sure you are taking care of yourself and hydrating. The most common causes of non cardiac chest pain in your age group are stress, acid reflux, and muscular causes.     Alarm symptoms are progressively worsening chest pain, especially if associated with exertion, shortness of breath, coughing blood, passing out, palpitations, calf pain/swelling - these symptoms warrant emergent re-evaluation in ER.

## 2023-09-21 ENCOUNTER — TELEPHONE (OUTPATIENT)
Dept: URGENT CARE | Facility: CLINIC | Age: 33
End: 2023-09-21
Payer: COMMERCIAL

## 2023-10-12 ENCOUNTER — PATIENT MESSAGE (OUTPATIENT)
Dept: SURGERY | Facility: CLINIC | Age: 33
End: 2023-10-12
Payer: COMMERCIAL

## 2023-11-21 ENCOUNTER — OFFICE VISIT (OUTPATIENT)
Dept: SURGERY | Facility: CLINIC | Age: 33
End: 2023-11-21
Payer: COMMERCIAL

## 2023-11-21 DIAGNOSIS — N63.11 UNSPECIFIED LUMP IN THE RIGHT BREAST, UPPER OUTER QUADRANT: Primary | ICD-10-CM

## 2023-11-21 PROBLEM — N63.15 UNSPECIFIED LUMP IN THE RIGHT BREAST, OVERLAPPING QUADRANTS: Status: RESOLVED | Noted: 2022-12-01 | Resolved: 2023-11-21

## 2023-11-21 PROCEDURE — 99213 PR OFFICE/OUTPT VISIT, EST, LEVL III, 20-29 MIN: ICD-10-PCS | Mod: S$GLB,,, | Performed by: SURGERY

## 2023-11-21 PROCEDURE — 99213 OFFICE O/P EST LOW 20 MIN: CPT | Mod: S$GLB,,, | Performed by: SURGERY

## 2023-11-21 PROCEDURE — 76642 PR US BREAST UNILAT LIMITED: ICD-10-PCS | Mod: S$GLB,,, | Performed by: SURGERY

## 2023-11-21 PROCEDURE — 76642 ULTRASOUND BREAST LIMITED: CPT | Mod: S$GLB,,, | Performed by: SURGERY

## 2023-11-21 NOTE — PROGRESS NOTES
Breast Surgical Oncology  [place]    Date of Service: 2023    SUBJECTIVE:   Chief complaint: enlarging right breast mass    HISTORY OF PRESENT ILLNESS:   Deana Sanchez is a 33 y.o. female who is kindly referred by Dr. Isidro Acosta for an enlarging right breast mass.    22  She has had bilateral breast masses for several years with the radiologic appearance of small fibroadenomas which have been stable since 2016. However, recent diagnostic imaging revealed a new right breast mass at 9:00 5 CMFN measuring 3.5 cm in maximal dimension. The mass causes pain during her menstrual cycles. She is asymptomatic from the remaining bilateral breast masses. She denies breast concerns such as skin changes, nipple discharge, nipple retraction or lumps under the arm.     23  She underwent excisional biopsy of her right breast mass at 9:00, 5 CMFN which was a benign fibroadenoma. She is here today for 6 month surveillance of her remaining fibroadenomas which have been stable since 2016. She reports a new palpable mass in her right breast axillary tail. This was noted a few weeks ago.      23  She is here for imaging observation of her right upper outer quadrant mass. Her retro-areolar benign fibroadenomas have remained stable since 2016. She reports soreness to her UOQ during menses. She denies breast concerns such as pain, new masses, skin changes, nipple discharge, nipple retraction or lumps under the arm.       Her breast cancer risk factor profile is as follows: Menarche at 16, Menopause at N/A.  She is . Family history of cancer is as follows: paternal Aunt with breast cancer at age 58; currently living.    FAMILY HISTORY:     Family History   Problem Relation Age of Onset    Diabetes type II Mother     Heart attack Father     Breast cancer Maternal Aunt         PAST MEDICAL HISTORY:   No past medical history on file.    SURGICAL HISTORY:     Past Surgical History:   Procedure  Laterality Date    ANKLE SURGERY      BREAST MASS EXCISION Right 12/6/2022    Procedure: EXCISION, MASS, BREAST;  Surgeon: Naheed Patel MD;  Location: [facility] OR;  Service: General;  Laterality: Right;  intraoperative needle localization, need US    KNEE SURGERY      MANDIBLE SURGERY      NEEDLE LOCALIZATION Right 12/6/2022    Procedure: NEEDLE LOCALIZATION;  Surgeon: Naheed Patel MD;  Location: [facility] OR;  Service: General;  Laterality: Right;  intra-op       SOCIAL HISTORY:     Social History     Tobacco Use    Smoking status: Some Days     Current packs/day: 1.00     Types: Cigarettes    Smokeless tobacco: Never    Tobacco comments:     working on quitting   Substance Use Topics    Alcohol use: Yes     Alcohol/week: 1.0 standard drink of alcohol     Types: 1 Standard drinks or equivalent per week    Drug use: No      Reports 2 packs per week tobacco use    MEDICATIONS/ALLERGIES:     Current Outpatient Medications:     fluticasone propionate (FLONASE) 50 mcg/actuation nasal spray, 2 sprays (100 mcg total) by Each Nostril route once daily. (Patient not taking: Reported on 9/18/2023), Disp: 16 g, Rfl: 0    ondansetron (ZOFRAN-ODT) 8 MG TbDL, Take 1 tablet (8 mg total) by mouth every 8 (eight) hours as needed (Nausea). (Patient not taking: Reported on 9/18/2023), Disp: 12 tablet, Rfl: 0    traMADoL (ULTRAM) 50 mg tablet, Take 1 tablet (50 mg total) by mouth every 6 (six) hours as needed for Pain. (Patient not taking: Reported on 9/18/2023), Disp: 16 tablet, Rfl: 0  No current facility-administered medications for this visit.    Facility-Administered Medications Ordered in Other Visits:     lactated ringers infusion, , Intravenous, Continuous, Vidya Rivera MD, Last Rate: 200 mL/hr at 12/06/22 0922, Rate Change at 12/06/22 0922  Review of patient's allergies indicates:   Allergen Reactions    Morphine Other (See Comments)     aggression       REVIEW OF SYSTEMS:   I have reviewed 12 systems, including 2  points per system. Pertinent reported positives are: none    PHYSICAL EXAM:   General: The patient appears well and is in no acute distress.     Chaperon present for examination.   BREAST EXAM  No Asymmetry  Right:  - Mass: yes, 2 cm mobile mass SA position; 1 cm mass 10:00, 10 CMFN  - Skin change: No  - Nipple Discharge: No  - Nipple retraction: No  - Axillary LAD: No  Left:   - Mass: No  - Skin change: No  - Nipple Discharge: No  - Nipple retraction: No  - Axillary LAD: No    BREAST ULTRASOUND PROCEDURE    Focused sonography of the upper outer quadrant of the RIGHT breast was performed in 2 views, radial and antiradial.  A(n) hypoechoic oval lesion with circumscribed borders was noted in the 10:00 10 cm from the nipple position. There was no posterior shadowing. The lesion measured 1.53 x 0.59 x 1.67 cm and is parallel to the skin.  The lesion correlates with the palpable abnormality. The findings are most consistent with a fibroadenoma.  BI-RADS 3.     IMAGING:   Images were personally reviewed.     Results for orders placed during the hospital encounter of 11/10/22    Mammo Digital Diagnostic Bilat with Aman    Narrative  Result:  Mammo Digital Diagnostic Bilat with Aman  US Breast Bilateral Limited    History:  Patient is 32 y.o. and is seen for diagnostic imaging.    Films Compared:  Compared to: 06/14/2016 US Breast Bilateral Limited, 12/17/2015 US Breast Left Limited, and 12/08/2015 US Breast Right Limited    Findings:  This procedure was performed using tomosynthesis. Computer-aided detection was utilized in the interpretation of this examination.  The breasts are heterogeneously dense, which may obscure small masses.    Left  Mammo Digital Diagnostic Bilat with Aman  There are 2 similar masses seen in the lower outer quadrant of the left breast.    There is no evidence of suspicious masses, calcifications, or other abnormal findings in the left breast.    US Breast Bilateral Limited  There is an 8 mm x 10  mm x 4 mm oval, parallel, hypoechoic mass seen in the lower outer quadrant of the left breast. Additional parallel hypoechoic lesion at 06:00 o'clock measuring 10 mm x 4 mm x 12 mm.  Both these lesions are most consistent with fibroadenomas and are stable when compared to the study from 2016.    There is no evidence of suspicious masses or other abnormal findings in the left breast.    Right  Mammo Digital Diagnostic Bilat with Aman  There is a mass seen in the outer region of the right breast.    There are 2 similar masses seen in the right breast.    US Breast Bilateral Limited  There is a 35 mm x 17 mm x 33 mm oval, parallel, hypoechoic mass with posterior enhancement seen in the right breast at 9 o'clock. Finding most likely representative of a fibroadenoma although due to the size tissue sampling versus surgical resection is recommended.  This lesion was not imaged on prior studies from 2016.  No axillary adenopathy.    There is a 24 mm x 5 mm x 10 mm mass seen in the retroareolar region of the right breast. Additional subareolar mass measures 14 x 6 by 11 mm.  Both of these lesions are stable when compared to 2016 and are most consistent with fibroadenomas.    Impression  Left  Mammo Digital Diagnostic Bilat with Aman  There is no mammographic evidence of malignancy in the left breast.    US Breast Bilateral Limited  There is no sonographic evidence of malignancy in the left breast.    Right  Mass: Right breast mass at the outer position. Assessment: 4A - Suspicious finding. Ultrasound-guided biopsy is recommended.    BI-RADS Category:  Overall: 4 - Suspicious      Recommendation:    Ultrasound-guided biopsy is recommended.        Your estimated lifetime risk of breast cancer (to age 85) based on Tyrer-Cuzick risk assessment model is Tyrer-Cuzick: 11.68 %. According to the [employer], patients with a lifetime breast cancer risk of 20% or higher might benefit from supplemental screening  tests.      PATHOLOGY:     Lab Results   Component Value Date    FPATHDX  12/06/2022     Right breast, 9 o'clock position, excisional biopsy:  Benign fibroadenoma, completely excised       ASSESSMENT:     1. Unspecified lump in the right breast, upper outer quadrant      PLAN:     We discussed that fibroadenoma is a benign mass of the breast which does not usually require surgical excision. I recommend removing fibroadenomas that are large (>3cm), rapidly enlarging or pathologic features (such as increased cellularity) which are concerning.     The remaining small fibroadenomas of bilateral breasts have remained stable since 2016.  Due to this they do not need interval surveillance or warrant surgical excision.     She will return in 6 months for interval ultrasound and clinic exam of her right UOQ BIRADS 3 mass.    I spent a total of 25 minutes on this visit. This includes face to face time and non-face to face time preparing to see the patient (eg, review of tests), obtaining and/or reviewing separately obtained history, documenting clinical information in the electronic or other health record, independently interpreting results and communicating results to the patient/family/caregiver, or care coordinator.      Naheed Patel M.D.

## 2024-07-23 ENCOUNTER — OFFICE VISIT (OUTPATIENT)
Dept: URGENT CARE | Facility: CLINIC | Age: 34
End: 2024-07-23
Payer: COMMERCIAL

## 2024-07-23 VITALS
HEART RATE: 92 BPM | SYSTOLIC BLOOD PRESSURE: 99 MMHG | RESPIRATION RATE: 20 BRPM | HEIGHT: 63 IN | BODY MASS INDEX: 21.29 KG/M2 | WEIGHT: 120.13 LBS | TEMPERATURE: 99 F | OXYGEN SATURATION: 98 % | DIASTOLIC BLOOD PRESSURE: 72 MMHG

## 2024-07-23 DIAGNOSIS — R09.82 POST-NASAL DRIP: ICD-10-CM

## 2024-07-23 DIAGNOSIS — H65.93 FLUID LEVEL BEHIND TYMPANIC MEMBRANE OF BOTH EARS: ICD-10-CM

## 2024-07-23 DIAGNOSIS — R09.81 SINUS CONGESTION: ICD-10-CM

## 2024-07-23 DIAGNOSIS — J06.9 VIRAL URI WITH COUGH: Primary | ICD-10-CM

## 2024-07-23 LAB
CTP QC/QA: YES
SARS-COV-2 AG RESP QL IA.RAPID: NEGATIVE

## 2024-07-23 PROCEDURE — 87811 SARS-COV-2 COVID19 W/OPTIC: CPT | Mod: QW,S$GLB,, | Performed by: NURSE PRACTITIONER

## 2024-07-23 PROCEDURE — 99214 OFFICE O/P EST MOD 30 MIN: CPT | Mod: S$GLB,,, | Performed by: NURSE PRACTITIONER

## 2024-07-23 RX ORDER — FLUTICASONE PROPIONATE 50 MCG
1 SPRAY, SUSPENSION (ML) NASAL DAILY
Qty: 16 ML | Refills: 0 | Status: SHIPPED | OUTPATIENT
Start: 2024-07-23

## 2024-07-23 RX ORDER — PROMETHAZINE HYDROCHLORIDE AND DEXTROMETHORPHAN HYDROBROMIDE 6.25; 15 MG/5ML; MG/5ML
5 SYRUP ORAL NIGHTLY PRN
Qty: 118 ML | Refills: 0 | Status: SHIPPED | OUTPATIENT
Start: 2024-07-23

## 2024-07-23 RX ORDER — LEVOCETIRIZINE DIHYDROCHLORIDE 5 MG/1
5 TABLET, FILM COATED ORAL NIGHTLY
Qty: 30 TABLET | Refills: 0 | Status: SHIPPED | OUTPATIENT
Start: 2024-07-23 | End: 2024-08-22

## 2024-07-23 NOTE — PROGRESS NOTES
"Subjective:      Patient ID: Deana Sanchez is a 33 y.o. female.    Vitals:  height is 5' 3" (1.6 m) and weight is 54.5 kg (120 lb 2.4 oz). Her oral temperature is 99.3 °F (37.4 °C). Her blood pressure is 99/72 and her pulse is 92. Her respiration is 20 and oxygen saturation is 98%.     Chief Complaint: Sinus Problem    Deana Sanchez is a 33 year old female whom presents to urgent care today with for evaluation of  sinus congestion, cough, slight shortness of breath and sneezing since yesterday. She states her throat hurts as well only when talking because it feels like it's straining. She denies any exposure to any ill contacts. She has taken Mucinex with no relief.    Sinus Problem  This is a new problem. The current episode started yesterday. The problem has been gradually worsening since onset. There has been no fever. Her pain is at a severity of 0/10. She is experiencing no pain. Associated symptoms include congestion, coughing, a hoarse voice, shortness of breath, sinus pressure, sneezing and swollen glands. Pertinent negatives include no chills, diaphoresis, ear pain, headaches, neck pain or sore throat. Treatments tried: Mucinex. The treatment provided no relief.       Constitution: Negative for chills and sweating.   HENT:  Positive for congestion and sinus pressure. Negative for ear pain and sore throat.    Neck: Negative for neck pain.   Respiratory:  Positive for cough and shortness of breath.    Allergic/Immunologic: Positive for sneezing.   Neurological:  Negative for headaches.      Objective:     Physical Exam   Constitutional: She is oriented to person, place, and time. She appears well-developed.  Non-toxic appearance. She does not appear ill. No distress.   HENT:   Head: Normocephalic and atraumatic.   Ears:   Right Ear: Hearing, tympanic membrane, external ear and ear canal normal.   Left Ear: Hearing, tympanic membrane, external ear and ear canal normal.   Nose: Congestion " present. No mucosal edema, rhinorrhea or nasal deformity. No epistaxis. Right sinus exhibits no maxillary sinus tenderness and no frontal sinus tenderness. Left sinus exhibits no maxillary sinus tenderness and no frontal sinus tenderness.   Mouth/Throat: Uvula is midline, oropharynx is clear and moist and mucous membranes are normal. No trismus in the jaw. Normal dentition. No uvula swelling. No posterior oropharyngeal erythema.      Comments: +PNd  Bilateral TM are cloudy       Comments: Bilateral TM are cloudy   Eyes: Conjunctivae, EOM and lids are normal. Pupils are equal, round, and reactive to light. No scleral icterus.   Neck: Trachea normal and phonation normal. Neck supple. No neck rigidity present.   Cardiovascular: Normal rate, regular rhythm, normal heart sounds and normal pulses.   Pulmonary/Chest: Effort normal and breath sounds normal. No respiratory distress.   Abdominal: Normal appearance and bowel sounds are normal. She exhibits no distension. Soft. There is no abdominal tenderness.   Musculoskeletal: Normal range of motion.         General: No deformity. Normal range of motion.   Neurological: She is alert and oriented to person, place, and time. No cranial nerve deficit. She exhibits normal muscle tone. Coordination normal.   Skin: Skin is warm, dry, intact, not diaphoretic and not pale.   Psychiatric: Her speech is normal and behavior is normal. Judgment and thought content normal.   Nursing note and vitals reviewed.    Results for orders placed or performed in visit on 07/23/24   SARS Coronavirus 2 Antigen, POCT Manual Read   Result Value Ref Range    SARS Coronavirus 2 Antigen Negative Negative     Acceptable Yes       Assessment:     1. Viral URI with cough    2. Sinus congestion    3. Fluid level behind tympanic membrane of both ears    4. Post-nasal drip        Plan:       Viral URI with cough  -     levocetirizine (XYZAL) 5 MG tablet; Take 1 tablet (5 mg total) by mouth every  evening.  Dispense: 30 tablet; Refill: 0  -     fluticasone propionate (FLONASE) 50 mcg/actuation nasal spray; 1 spray (50 mcg total) by Each Nostril route once daily.  Dispense: 16 mL; Refill: 0  -     promethazine-dextromethorphan (PROMETHAZINE-DM) 6.25-15 mg/5 mL Syrp; Take 5 mLs by mouth nightly as needed (cough).  Dispense: 118 mL; Refill: 0    Sinus congestion  -     SARS Coronavirus 2 Antigen, POCT Manual Read  -     levocetirizine (XYZAL) 5 MG tablet; Take 1 tablet (5 mg total) by mouth every evening.  Dispense: 30 tablet; Refill: 0  -     fluticasone propionate (FLONASE) 50 mcg/actuation nasal spray; 1 spray (50 mcg total) by Each Nostril route once daily.  Dispense: 16 mL; Refill: 0    Fluid level behind tympanic membrane of both ears  -     levocetirizine (XYZAL) 5 MG tablet; Take 1 tablet (5 mg total) by mouth every evening.  Dispense: 30 tablet; Refill: 0  -     fluticasone propionate (FLONASE) 50 mcg/actuation nasal spray; 1 spray (50 mcg total) by Each Nostril route once daily.  Dispense: 16 mL; Refill: 0    Post-nasal drip  -     levocetirizine (XYZAL) 5 MG tablet; Take 1 tablet (5 mg total) by mouth every evening.  Dispense: 30 tablet; Refill: 0  -     fluticasone propionate (FLONASE) 50 mcg/actuation nasal spray; 1 spray (50 mcg total) by Each Nostril route once daily.  Dispense: 16 mL; Refill: 0          Medical Decision Making:   Differential Diagnosis:   Bronchitis, COPD/Asthma exacerbation, Viral upper respiratory infection, Bacterial upper respiratory infection, Pneumonia, covid19, influenza,bacterial vs viral sinusitis.     Clinical Tests:   Lab Tests: Ordered and Reviewed       <> Summary of Lab: Covid negative  Urgent Care Management:  Previous encounters and labs were independently reviewed. Reviewed negative COVID results with patient. Given patients presenting symptoms discussed recommendation of repeat COVID testing within 48 hours. Discussed symptomatic treatment. Patient instructed to  isolate until confirmed negative test in 48 hours, if fever is presents.  Patient should wear a mask , if fever free, until a confirmed negative covid test in 48 hours. Additional plan of care as outlined above.Treatment plan as well as options and alternatives reviewed and discussed with patient.Follow up in 48 hours for repeat testing if symptoms persist. Report to the nearest ER with worsening/new concerning symptoms.  All of the patients questions and concerns were addressed.The patient verbalized understanding and agrees with the discussed plan of care. Patient remained stable and was discharged in no acute distress.            Patient Instructions   You have tested negative for COVID on our Binax test. As a follow up the recommendation is if you have symptoms within the first 7 days to retest within 48 hours. If you have NO SYMPTONS then you should test every 48 hours two more times.    Viral Illness  Most of the time, viruses cause upper respiratory infections. There is no specific treatment for the viruses that cause the common cold. Most treatments are aimed at relieving some of the symptoms of the cold but do not shorten or cure the cold. Antibiotics are not useful for treating a viral illness; antibiotics are only used to treat illnesses caused by bacteria, not viruses. Unnecessary use of antibiotics for the treatment of the common cold can cause allergic reactions, diarrhea, or other gastrointestinal symptoms in some patients.    How Long Will Symptoms last?  Symptoms usually worsen during the first 3-5 days, followed by  gradual improvement. Most URIs resolve within 10-14 days. Coughing often occurs during the later stages of a URI. It may be dry or produce phlegm or mucus.  Nasal congestion, post nasal dripping, or sinus pressure:   Use an oral decongestant, such as pseudoephedrine or Mucinex D to reduce nasal and sinus congestion. Decongestants are available at pharmacies   Nasal sprays, such as  "fluticasone (Flonase), can help relief sneezing, runny nose and nasal congestion, and may take up to one week of consistent use to manage symptoms.    Nasal rinsing with saline nasal spray or salt water (e.g., neti pot) can help relieve nasal dryness.  If you just have drainage you can take an oral antihistamine such as Zyrtec, Claritin, xyzal or Allegra     Use a warm mist humidifier or take a steamy shower to increase moisture in the air and soothe oral and nasal tissues that become irritated with dry air.   Decongestant nasal sprays ,such as Afrin, should never be used for more than two to three days; use for more than three days use can worsen nasal congestion.    Sore throat:    Use a pain reliever, such as acetaminophen (Tylenol) or ibuprofen (Advil).-  unless you have an allergy to them or medical condition such as stomach ulcers, kidney or liver disease or blood thinners etc for which you should not be taking these type of medications.    Gargle with salt water (1/4 tsp of salt dissolved in 8 oz of warm water). Salt water can be used as often as you like.   Throat lozenges or throat sprays (Cepacol lozenges or Chloroseptic spray) may bring some relief by increasing saliva production and lubricating your throat.   Drink plenty of fluids (e.g., water, diluted juice, tea) to soothe your throat and to stay well hydrated.   Cough   Medications that contain dextromethorphan (e.g., Robitussin DM, Mucinex DM, Delsym) may help to suppress a cough.   Tea with honey, when taken regularly, can soothe a sore throat and help suppress a cough.   If you have hypertension avoid using the "D" which is the decongestant.  Instead you can use Coricidin HBP for cold and cough symptoms.    Take prescription cough meds (pills) as prescribed; take prescription cough syrup at night as needed for cough.      Promethazine DM as needed for cough. This medication may cause drowsiness so please avoid operating heavy machinery or driving " while taking this medication.    Strengthen your immune system:    Make sure you eat well (e.g., a healthy, balanced variety of foods).   Avoid alcohol as it can directly suppress various immune responses.   Stay away from cigarettes, and second hand smoke.   Rest as much as possible and get plenty of sleep (at least 8 hours).  Reduce risk of spreading your virus to others   URI infections are contagious; help reduce the spread.   Wash your hands frequently and/or use a hand  after touching your face.  Cover your mouth when coughing or sneezing.   Avoid sharing items like cups and lip balm     Please arrange follow up with your primary medical clinic as soon as possible. You must understand that you've received an Urgent Care treatment only and that you may be released before all of your medical problems are known or treated. You, the patient, will arrange for follow up as instructed. If your symptoms worsen or fail to improve you should go to the Emergency Room.

## 2024-07-23 NOTE — LETTER
July 23, 2024      [facility]  [address]  [facility] [address]  Phone: [phone]       Patient: Deana Sanchez   YOB: 1990  Date of Visit: 07/23/2024    To Whom It May Concern:    Juan Carlos Sanchez  was at [facility] on 07/23/2024. The patient may return to work/school on 07/25/24 with no restrictions. If you have any questions or concerns, or if I can be of further assistance, please do not hesitate to contact me.    Sincerely,        Adrianna Brown NP

## 2024-07-23 NOTE — PATIENT INSTRUCTIONS
You have tested negative for COVID on our Binax test. As a follow up the recommendation is if you have symptoms within the first 7 days to retest within 48 hours. If you have NO SYMPTONS then you should test every 48 hours two more times.    Viral Illness  Most of the time, viruses cause upper respiratory infections. There is no specific treatment for the viruses that cause the common cold. Most treatments are aimed at relieving some of the symptoms of the cold but do not shorten or cure the cold. Antibiotics are not useful for treating a viral illness; antibiotics are only used to treat illnesses caused by bacteria, not viruses. Unnecessary use of antibiotics for the treatment of the common cold can cause allergic reactions, diarrhea, or other gastrointestinal symptoms in some patients.    How Long Will Symptoms last?  Symptoms usually worsen during the first 3-5 days, followed by  gradual improvement. Most URIs resolve within 10-14 days. Coughing often occurs during the later stages of a URI. It may be dry or produce phlegm or mucus.  Nasal congestion, post nasal dripping, or sinus pressure:   Use an oral decongestant, such as pseudoephedrine or Mucinex D to reduce nasal and sinus congestion. Decongestants are available at pharmacies   Nasal sprays, such as fluticasone (Flonase), can help relief sneezing, runny nose and nasal congestion, and may take up to one week of consistent use to manage symptoms.    Nasal rinsing with saline nasal spray or salt water (e.g., neti pot) can help relieve nasal dryness.  If you just have drainage you can take an oral antihistamine such as Zyrtec, Claritin, xyzal or Allegra     Use a warm mist humidifier or take a steamy shower to increase moisture in the air and soothe oral and nasal tissues that become irritated with dry air.   Decongestant nasal sprays ,such as Afrin, should never be used for more than two to three days; use for more than three days use can worsen nasal  "congestion.    Sore throat:    Use a pain reliever, such as acetaminophen (Tylenol) or ibuprofen (Advil).-  unless you have an allergy to them or medical condition such as stomach ulcers, kidney or liver disease or blood thinners etc for which you should not be taking these type of medications.    Gargle with salt water (1/4 tsp of salt dissolved in 8 oz of warm water). Salt water can be used as often as you like.   Throat lozenges or throat sprays (Cepacol lozenges or Chloroseptic spray) may bring some relief by increasing saliva production and lubricating your throat.   Drink plenty of fluids (e.g., water, diluted juice, tea) to soothe your throat and to stay well hydrated.   Cough   Medications that contain dextromethorphan (e.g., Robitussin DM, Mucinex DM, Delsym) may help to suppress a cough.   Tea with honey, when taken regularly, can soothe a sore throat and help suppress a cough.   If you have hypertension avoid using the "D" which is the decongestant.  Instead you can use Coricidin HBP for cold and cough symptoms.    Take prescription cough meds (pills) as prescribed; take prescription cough syrup at night as needed for cough.      Promethazine DM as needed for cough. This medication may cause drowsiness so please avoid operating heavy machinery or driving while taking this medication.    Strengthen your immune system:    Make sure you eat well (e.g., a healthy, balanced variety of foods).   Avoid alcohol as it can directly suppress various immune responses.   Stay away from cigarettes, and second hand smoke.   Rest as much as possible and get plenty of sleep (at least 8 hours).  Reduce risk of spreading your virus to others   URI infections are contagious; help reduce the spread.   Wash your hands frequently and/or use a hand  after touching your face.  Cover your mouth when coughing or sneezing.   Avoid sharing items like cups and lip balm     Please arrange follow up with your primary medical " clinic as soon as possible. You must understand that you've received an Urgent Care treatment only and that you may be released before all of your medical problems are known or treated. You, the patient, will arrange for follow up as instructed. If your symptoms worsen or fail to improve you should go to the Emergency Room.

## 2025-04-02 ENCOUNTER — LAB VISIT (OUTPATIENT)
Dept: LAB | Facility: HOSPITAL | Age: 35
End: 2025-04-02
Attending: SURGERY
Payer: COMMERCIAL

## 2025-04-02 ENCOUNTER — OFFICE VISIT (OUTPATIENT)
Dept: SURGERY | Facility: CLINIC | Age: 35
End: 2025-04-02
Payer: COMMERCIAL

## 2025-04-02 VITALS
SYSTOLIC BLOOD PRESSURE: 113 MMHG | WEIGHT: 122.56 LBS | HEIGHT: 63 IN | BODY MASS INDEX: 21.71 KG/M2 | HEART RATE: 99 BPM | DIASTOLIC BLOOD PRESSURE: 77 MMHG

## 2025-04-02 DIAGNOSIS — Z01.818 PRE-OP TESTING: Primary | ICD-10-CM

## 2025-04-02 DIAGNOSIS — N63.11 MASS OF UPPER OUTER QUADRANT OF RIGHT BREAST: ICD-10-CM

## 2025-04-02 DIAGNOSIS — N63.11 MASS OF UPPER OUTER QUADRANT OF RIGHT BREAST: Primary | ICD-10-CM

## 2025-04-02 LAB
ABSOLUTE EOSINOPHIL (OHS): 0.09 K/UL
ABSOLUTE MONOCYTE (OHS): 0.5 K/UL (ref 0.3–1)
ABSOLUTE NEUTROPHIL COUNT (OHS): 3.21 K/UL (ref 1.8–7.7)
BASOPHILS # BLD AUTO: 0.04 K/UL
BASOPHILS NFR BLD AUTO: 0.7 %
ERYTHROCYTE [DISTWIDTH] IN BLOOD BY AUTOMATED COUNT: 17.1 % (ref 11.5–14.5)
HCT VFR BLD AUTO: 30.1 % (ref 37–48.5)
HGB BLD-MCNC: 8.6 GM/DL (ref 12–16)
IMM GRANULOCYTES # BLD AUTO: 0.01 K/UL (ref 0–0.04)
IMM GRANULOCYTES NFR BLD AUTO: 0.2 % (ref 0–0.5)
LYMPHOCYTES # BLD AUTO: 2.11 K/UL (ref 1–4.8)
MCH RBC QN AUTO: 20.6 PG (ref 27–31)
MCHC RBC AUTO-ENTMCNC: 28.6 G/DL (ref 32–36)
MCV RBC AUTO: 72 FL (ref 82–98)
NUCLEATED RBC (/100WBC) (OHS): 0 /100 WBC
PLATELET # BLD AUTO: 282 K/UL (ref 150–450)
PMV BLD AUTO: 10.6 FL (ref 9.2–12.9)
RBC # BLD AUTO: 4.17 M/UL (ref 4–5.4)
RELATIVE EOSINOPHIL (OHS): 1.5 %
RELATIVE LYMPHOCYTE (OHS): 35.4 % (ref 18–48)
RELATIVE MONOCYTE (OHS): 8.4 % (ref 4–15)
RELATIVE NEUTROPHIL (OHS): 53.8 % (ref 38–73)
WBC # BLD AUTO: 5.96 K/UL (ref 3.9–12.7)

## 2025-04-02 PROCEDURE — 1160F RVW MEDS BY RX/DR IN RCRD: CPT | Mod: CPTII,S$GLB,, | Performed by: SURGERY

## 2025-04-02 PROCEDURE — 99024 POSTOP FOLLOW-UP VISIT: CPT | Mod: S$GLB,,, | Performed by: SURGERY

## 2025-04-02 PROCEDURE — 36415 COLL VENOUS BLD VENIPUNCTURE: CPT

## 2025-04-02 PROCEDURE — 99999 PR PBB SHADOW E&M-EST. PATIENT-LVL III: CPT | Mod: PBBFAC,,, | Performed by: SURGERY

## 2025-04-02 PROCEDURE — 85025 COMPLETE CBC W/AUTO DIFF WBC: CPT

## 2025-04-02 PROCEDURE — 3074F SYST BP LT 130 MM HG: CPT | Mod: CPTII,S$GLB,, | Performed by: SURGERY

## 2025-04-02 PROCEDURE — 3078F DIAST BP <80 MM HG: CPT | Mod: CPTII,S$GLB,, | Performed by: SURGERY

## 2025-04-02 PROCEDURE — 1159F MED LIST DOCD IN RCRD: CPT | Mod: CPTII,S$GLB,, | Performed by: SURGERY

## 2025-04-02 RX ORDER — CEFAZOLIN SODIUM 2 G/50ML
2 SOLUTION INTRAVENOUS
OUTPATIENT
Start: 2025-04-02

## 2025-04-02 RX ORDER — LIDOCAINE HYDROCHLORIDE 10 MG/ML
1 INJECTION, SOLUTION EPIDURAL; INFILTRATION; INTRACAUDAL; PERINEURAL ONCE
OUTPATIENT
Start: 2025-04-02 | End: 2025-04-02

## 2025-04-02 RX ORDER — SODIUM CHLORIDE 9 MG/ML
INJECTION, SOLUTION INTRAVENOUS CONTINUOUS
OUTPATIENT
Start: 2025-04-02

## 2025-04-02 NOTE — PROGRESS NOTES
Breast Surgical Oncology  [place]    Date of Service: 2025    SUBJECTIVE:   Chief complaint:  Painful right breast mass    HISTORY OF PRESENT ILLNESS:   Deana Sanchez is a 34 y.o. female who is kindly referred by Dr. Isidro Acosta for an enlarging right breast mass.    She presents today due to continued discomfort of right upper outer quadrant breast mass.  Becomes more painful during menstrual cycle.  Denies pain at other areas.    22  She has had bilateral breast masses for several years with the radiologic appearance of small fibroadenomas which have been stable since 2016. However, recent diagnostic imaging revealed a new right breast mass at 9:00 5 CMFN measuring 3.5 cm in maximal dimension. The mass causes pain during her menstrual cycles. She is asymptomatic from the remaining bilateral breast masses. She denies breast concerns such as skin changes, nipple discharge, nipple retraction or lumps under the arm.     23  She underwent excisional biopsy of her right breast mass at 9:00, 5 CMFN which was a benign fibroadenoma. She is here today for 6 month surveillance of her remaining fibroadenomas which have been stable since 2016. She reports a new palpable mass in her right breast axillary tail. This was noted a few weeks ago.      23  She is here for imaging observation of her right upper outer quadrant mass. Her retro-areolar benign fibroadenomas have remained stable since 2016. She reports soreness to her UOQ during menses. She denies breast concerns such as pain, new masses, skin changes, nipple discharge, nipple retraction or lumps under the arm.       Her breast cancer risk factor profile is as follows: Menarche at 16, Menopause at N/A.  She is . Family history of cancer is as follows: paternal Aunt with breast cancer at age 58; currently living.    FAMILY HISTORY:     Family History   Problem Relation Name Age of Onset    Diabetes type II Mother      Heart  attack Father      Breast cancer Maternal Aunt          PAST MEDICAL HISTORY:   No past medical history on file.    SURGICAL HISTORY:     Past Surgical History:   Procedure Laterality Date    ANKLE SURGERY      BREAST MASS EXCISION Right 12/6/2022    Procedure: EXCISION, MASS, BREAST;  Surgeon: Naheed Patel MD;  Location: [facility] OR;  Service: General;  Laterality: Right;  intraoperative needle localization, need US    KNEE SURGERY      MANDIBLE SURGERY      NEEDLE LOCALIZATION Right 12/6/2022    Procedure: NEEDLE LOCALIZATION;  Surgeon: Naheed Patel MD;  Location: [facility] OR;  Service: General;  Laterality: Right;  intra-op       SOCIAL HISTORY:     Social History     Tobacco Use    Smoking status: Some Days     Current packs/day: 1.00     Types: Cigarettes    Smokeless tobacco: Never    Tobacco comments:     working on quitting   Substance Use Topics    Alcohol use: Yes     Alcohol/week: 1.0 standard drink of alcohol     Types: 1 Standard drinks or equivalent per week    Drug use: No      Reports 2 packs per week tobacco use    MEDICATIONS/ALLERGIES:     Current Outpatient Medications:     fluticasone propionate (FLONASE) 50 mcg/actuation nasal spray, 2 sprays (100 mcg total) by Each Nostril route once daily. (Patient not taking: Reported on 9/18/2023), Disp: 16 g, Rfl: 0    fluticasone propionate (FLONASE) 50 mcg/actuation nasal spray, 1 spray (50 mcg total) by Each Nostril route once daily., Disp: 16 mL, Rfl: 0    levocetirizine (XYZAL) 5 MG tablet, Take 1 tablet (5 mg total) by mouth every evening., Disp: 30 tablet, Rfl: 0    ondansetron (ZOFRAN-ODT) 8 MG TbDL, Take 1 tablet (8 mg total) by mouth every 8 (eight) hours as needed (Nausea). (Patient not taking: Reported on 9/18/2023), Disp: 12 tablet, Rfl: 0    promethazine-dextromethorphan (PROMETHAZINE-DM) 6.25-15 mg/5 mL Syrp, Take 5 mLs by mouth nightly as needed (cough)., Disp: 118 mL, Rfl: 0    traMADoL (ULTRAM) 50 mg tablet, Take 1 tablet (50 mg  total) by mouth every 6 (six) hours as needed for Pain. (Patient not taking: Reported on 9/18/2023), Disp: 16 tablet, Rfl: 0  No current facility-administered medications for this visit.    Facility-Administered Medications Ordered in Other Visits:     lactated ringers infusion, , Intravenous, Continuous, Vidya Rivera MD, Last Rate: 200 mL/hr at 12/06/22 0922, Rate Change at 12/06/22 0922  Review of patient's allergies indicates:   Allergen Reactions    Morphine Other (See Comments)     aggression       REVIEW OF SYSTEMS:   I have reviewed 12 systems, including 2 points per system. Pertinent reported positives are: none    PHYSICAL EXAM:   General: The patient appears well and is in no acute distress.     Chaperon present for examination.   BREAST EXAM  No Asymmetry  Right:  - Mass: yes, 2 cm mobile mass 10:00, 10 CMFN  - Skin change: No  - Nipple Discharge: No  - Nipple retraction: No  - Axillary LAD: No  Left:   - Mass: No  - Skin change: No  - Nipple Discharge: No  - Nipple retraction: No  - Axillary LAD: No      IMAGING:       Results for orders placed during the hospital encounter of 11/10/22    Mammo Digital Diagnostic Bilat with Aman    Narrative  Result:  Mammo Digital Diagnostic Bilat with Aman  US Breast Bilateral Limited    History:  Patient is 32 y.o. and is seen for diagnostic imaging.    Films Compared:  Compared to: 06/14/2016 US Breast Bilateral Limited, 12/17/2015 US Breast Left Limited, and 12/08/2015 US Breast Right Limited    Findings:  This procedure was performed using tomosynthesis. Computer-aided detection was utilized in the interpretation of this examination.  The breasts are heterogeneously dense, which may obscure small masses.    Left  Mammo Digital Diagnostic Bilat with Aman  There are 2 similar masses seen in the lower outer quadrant of the left breast.    There is no evidence of suspicious masses, calcifications, or other abnormal findings in the left breast.    US Breast  Bilateral Limited  There is an 8 mm x 10 mm x 4 mm oval, parallel, hypoechoic mass seen in the lower outer quadrant of the left breast. Additional parallel hypoechoic lesion at 06:00 o'clock measuring 10 mm x 4 mm x 12 mm.  Both these lesions are most consistent with fibroadenomas and are stable when compared to the study from 2016.    There is no evidence of suspicious masses or other abnormal findings in the left breast.    Right  Mammo Digital Diagnostic Bilat with Aman  There is a mass seen in the outer region of the right breast.    There are 2 similar masses seen in the right breast.    US Breast Bilateral Limited  There is a 35 mm x 17 mm x 33 mm oval, parallel, hypoechoic mass with posterior enhancement seen in the right breast at 9 o'clock. Finding most likely representative of a fibroadenoma although due to the size tissue sampling versus surgical resection is recommended.  This lesion was not imaged on prior studies from 2016.  No axillary adenopathy.    There is a 24 mm x 5 mm x 10 mm mass seen in the retroareolar region of the right breast. Additional subareolar mass measures 14 x 6 by 11 mm.  Both of these lesions are stable when compared to 2016 and are most consistent with fibroadenomas.    Impression  Left  Mammo Digital Diagnostic Bilat with Aman  There is no mammographic evidence of malignancy in the left breast.    US Breast Bilateral Limited  There is no sonographic evidence of malignancy in the left breast.    Right  Mass: Right breast mass at the outer position. Assessment: 4A - Suspicious finding. Ultrasound-guided biopsy is recommended.    BI-RADS Category:  Overall: 4 - Suspicious      Recommendation:    Ultrasound-guided biopsy is recommended.        Your estimated lifetime risk of breast cancer (to age 85) based on Tyrer-Cuzick risk assessment model is Tyrer-Cuzick: 11.68 %. According to the American Cancer Society, patients with a lifetime breast cancer risk of 20% or higher might  benefit from supplemental screening tests.      PATHOLOGY:     Lab Results   Component Value Date    FPATHDX  12/06/2022     Right breast, 9 o'clock position, excisional biopsy:  Benign fibroadenoma, completely excised       ASSESSMENT:     1. Mass of upper outer quadrant of right breast    2. Breast mass, right      PLAN:     Symptomatic right breast mass - similar appearance in size and character compared to previous findings however continues to cause discomfort  Right breast mass excisional biopsy   Preop:  CBC   Risks and benefits discussed with patient including but not limited to: Pain, bleeding, infection, scarring, alternative diagnosis/malignancy, need for further procedure.  We also discussed other options including imaging, core biopsy, observation.  All questions were answered    I spent a total of 30 minutes on this visit. This includes face to face time and non-face to face time preparing to see the patient (eg, review of tests), obtaining and/or reviewing separately obtained history, documenting clinical information in the electronic or other health record, independently interpreting results and communicating results to the patient/family/caregiver, or care coordinator.      Vaibhav Trimble M.D.

## 2025-04-02 NOTE — H&P (VIEW-ONLY)
Breast Surgical Oncology  [place]    Date of Service: 2025    SUBJECTIVE:   Chief complaint:  Painful right breast mass    HISTORY OF PRESENT ILLNESS:   Deana Sanchez is a 34 y.o. female who is kindly referred by Dr. Isidro Acosta for an enlarging right breast mass.    She presents today due to continued discomfort of right upper outer quadrant breast mass.  Becomes more painful during menstrual cycle.  Denies pain at other areas.    22  She has had bilateral breast masses for several years with the radiologic appearance of small fibroadenomas which have been stable since 2016. However, recent diagnostic imaging revealed a new right breast mass at 9:00 5 CMFN measuring 3.5 cm in maximal dimension. The mass causes pain during her menstrual cycles. She is asymptomatic from the remaining bilateral breast masses. She denies breast concerns such as skin changes, nipple discharge, nipple retraction or lumps under the arm.     23  She underwent excisional biopsy of her right breast mass at 9:00, 5 CMFN which was a benign fibroadenoma. She is here today for 6 month surveillance of her remaining fibroadenomas which have been stable since 2016. She reports a new palpable mass in her right breast axillary tail. This was noted a few weeks ago.      23  She is here for imaging observation of her right upper outer quadrant mass. Her retro-areolar benign fibroadenomas have remained stable since 2016. She reports soreness to her UOQ during menses. She denies breast concerns such as pain, new masses, skin changes, nipple discharge, nipple retraction or lumps under the arm.       Her breast cancer risk factor profile is as follows: Menarche at 16, Menopause at N/A.  She is . Family history of cancer is as follows: paternal Aunt with breast cancer at age 58; currently living.    FAMILY HISTORY:     Family History   Problem Relation Name Age of Onset    Diabetes type II Mother      Heart  attack Father      Breast cancer Maternal Aunt          PAST MEDICAL HISTORY:   No past medical history on file.    SURGICAL HISTORY:     Past Surgical History:   Procedure Laterality Date    ANKLE SURGERY      BREAST MASS EXCISION Right 12/6/2022    Procedure: EXCISION, MASS, BREAST;  Surgeon: Naheed Patel MD;  Location: [facility] OR;  Service: General;  Laterality: Right;  intraoperative needle localization, need US    KNEE SURGERY      MANDIBLE SURGERY      NEEDLE LOCALIZATION Right 12/6/2022    Procedure: NEEDLE LOCALIZATION;  Surgeon: Naheed Patel MD;  Location: [facility] OR;  Service: General;  Laterality: Right;  intra-op       SOCIAL HISTORY:     Social History     Tobacco Use    Smoking status: Some Days     Current packs/day: 1.00     Types: Cigarettes    Smokeless tobacco: Never    Tobacco comments:     working on quitting   Substance Use Topics    Alcohol use: Yes     Alcohol/week: 1.0 standard drink of alcohol     Types: 1 Standard drinks or equivalent per week    Drug use: No      Reports 2 packs per week tobacco use    MEDICATIONS/ALLERGIES:     Current Outpatient Medications:     fluticasone propionate (FLONASE) 50 mcg/actuation nasal spray, 2 sprays (100 mcg total) by Each Nostril route once daily. (Patient not taking: Reported on 9/18/2023), Disp: 16 g, Rfl: 0    fluticasone propionate (FLONASE) 50 mcg/actuation nasal spray, 1 spray (50 mcg total) by Each Nostril route once daily., Disp: 16 mL, Rfl: 0    levocetirizine (XYZAL) 5 MG tablet, Take 1 tablet (5 mg total) by mouth every evening., Disp: 30 tablet, Rfl: 0    ondansetron (ZOFRAN-ODT) 8 MG TbDL, Take 1 tablet (8 mg total) by mouth every 8 (eight) hours as needed (Nausea). (Patient not taking: Reported on 9/18/2023), Disp: 12 tablet, Rfl: 0    promethazine-dextromethorphan (PROMETHAZINE-DM) 6.25-15 mg/5 mL Syrp, Take 5 mLs by mouth nightly as needed (cough)., Disp: 118 mL, Rfl: 0    traMADoL (ULTRAM) 50 mg tablet, Take 1 tablet (50 mg  total) by mouth every 6 (six) hours as needed for Pain. (Patient not taking: Reported on 9/18/2023), Disp: 16 tablet, Rfl: 0  No current facility-administered medications for this visit.    Facility-Administered Medications Ordered in Other Visits:     lactated ringers infusion, , Intravenous, Continuous, Vidya Rivera MD, Last Rate: 200 mL/hr at 12/06/22 0922, Rate Change at 12/06/22 0922  Review of patient's allergies indicates:   Allergen Reactions    Morphine Other (See Comments)     aggression       REVIEW OF SYSTEMS:   I have reviewed 12 systems, including 2 points per system. Pertinent reported positives are: none    PHYSICAL EXAM:   General: The patient appears well and is in no acute distress.     Chaperon present for examination.   BREAST EXAM  No Asymmetry  Right:  - Mass: yes, 2 cm mobile mass 10:00, 10 CMFN  - Skin change: No  - Nipple Discharge: No  - Nipple retraction: No  - Axillary LAD: No  Left:   - Mass: No  - Skin change: No  - Nipple Discharge: No  - Nipple retraction: No  - Axillary LAD: No      IMAGING:       Results for orders placed during the hospital encounter of 11/10/22    Mammo Digital Diagnostic Bilat with Aman    Narrative  Result:  Mammo Digital Diagnostic Bilat with Aman  US Breast Bilateral Limited    History:  Patient is 32 y.o. and is seen for diagnostic imaging.    Films Compared:  Compared to: 06/14/2016 US Breast Bilateral Limited, 12/17/2015 US Breast Left Limited, and 12/08/2015 US Breast Right Limited    Findings:  This procedure was performed using tomosynthesis. Computer-aided detection was utilized in the interpretation of this examination.  The breasts are heterogeneously dense, which may obscure small masses.    Left  Mammo Digital Diagnostic Bilat with Aman  There are 2 similar masses seen in the lower outer quadrant of the left breast.    There is no evidence of suspicious masses, calcifications, or other abnormal findings in the left breast.    US Breast  Bilateral Limited  There is an 8 mm x 10 mm x 4 mm oval, parallel, hypoechoic mass seen in the lower outer quadrant of the left breast. Additional parallel hypoechoic lesion at 06:00 o'clock measuring 10 mm x 4 mm x 12 mm.  Both these lesions are most consistent with fibroadenomas and are stable when compared to the study from 2016.    There is no evidence of suspicious masses or other abnormal findings in the left breast.    Right  Mammo Digital Diagnostic Bilat with Aman  There is a mass seen in the outer region of the right breast.    There are 2 similar masses seen in the right breast.    US Breast Bilateral Limited  There is a 35 mm x 17 mm x 33 mm oval, parallel, hypoechoic mass with posterior enhancement seen in the right breast at 9 o'clock. Finding most likely representative of a fibroadenoma although due to the size tissue sampling versus surgical resection is recommended.  This lesion was not imaged on prior studies from 2016.  No axillary adenopathy.    There is a 24 mm x 5 mm x 10 mm mass seen in the retroareolar region of the right breast. Additional subareolar mass measures 14 x 6 by 11 mm.  Both of these lesions are stable when compared to 2016 and are most consistent with fibroadenomas.    Impression  Left  Mammo Digital Diagnostic Bilat with Aman  There is no mammographic evidence of malignancy in the left breast.    US Breast Bilateral Limited  There is no sonographic evidence of malignancy in the left breast.    Right  Mass: Right breast mass at the outer position. Assessment: 4A - Suspicious finding. Ultrasound-guided biopsy is recommended.    BI-RADS Category:  Overall: 4 - Suspicious      Recommendation:    Ultrasound-guided biopsy is recommended.        Your estimated lifetime risk of breast cancer (to age 85) based on Tyrer-Cuzick risk assessment model is Tyrer-Cuzick: 11.68 %. According to the American Cancer Society, patients with a lifetime breast cancer risk of 20% or higher might  benefit from supplemental screening tests.      PATHOLOGY:     Lab Results   Component Value Date    FPATHDX  12/06/2022     Right breast, 9 o'clock position, excisional biopsy:  Benign fibroadenoma, completely excised       ASSESSMENT:     1. Mass of upper outer quadrant of right breast    2. Breast mass, right      PLAN:     Symptomatic right breast mass - similar appearance in size and character compared to previous findings however continues to cause discomfort  Right breast mass excisional biopsy   Preop:  CBC   Risks and benefits discussed with patient including but not limited to: Pain, bleeding, infection, scarring, alternative diagnosis/malignancy, need for further procedure.  We also discussed other options including imaging, core biopsy, observation.  All questions were answered    I spent a total of 30 minutes on this visit. This includes face to face time and non-face to face time preparing to see the patient (eg, review of tests), obtaining and/or reviewing separately obtained history, documenting clinical information in the electronic or other health record, independently interpreting results and communicating results to the patient/family/caregiver, or care coordinator.      Vaibhav Trimble M.D.

## 2025-04-07 ENCOUNTER — PATIENT MESSAGE (OUTPATIENT)
Dept: PREADMISSION TESTING | Facility: HOSPITAL | Age: 35
End: 2025-04-07
Payer: COMMERCIAL

## 2025-04-08 ENCOUNTER — PATIENT MESSAGE (OUTPATIENT)
Dept: PREADMISSION TESTING | Facility: HOSPITAL | Age: 35
End: 2025-04-08
Payer: COMMERCIAL

## 2025-04-08 ENCOUNTER — ANESTHESIA EVENT (OUTPATIENT)
Dept: SURGERY | Facility: HOSPITAL | Age: 35
End: 2025-04-08
Payer: COMMERCIAL

## 2025-04-08 NOTE — ANESTHESIA PREPROCEDURE EVALUATION
04/08/2025  Deana Sanchez is a 34 y.o., female.      Pre-op Assessment    I have reviewed the Patient Summary Reports.     I have reviewed the Nursing Notes. I have reviewed the NPO Status.   I have reviewed the Medications.     Review of Systems  Anesthesia Hx:  No problems with previous Anesthesia   History of prior surgery of interest to airway management or planning:  Previous anesthesia: General        Denies Family Hx of Anesthesia complications.    Denies Personal Hx of Anesthesia complications.                    Social:  Smoker 1/2 - 1 ppd.      Hematology/Oncology:       -- Anemia:                                  Cardiovascular:  Cardiovascular Normal                                              Pulmonary:  Pulmonary Normal                       Renal/:  Renal/ Normal                 Hepatic/GI:  Hepatic/GI Normal                    Neurological:  Neurology Normal                                      Psych:  Psychiatric Normal                    Physical Exam  General: Well nourished    Airway:  Mallampati: III   Mouth Opening: Small, but > 3cm  TM Distance: 4 - 6 cm  Tongue: Normal  Neck ROM: Normal ROM    Dental:  Intact        Anesthesia Plan  Type of Anesthesia, risks & benefits discussed:    Anesthesia Type: Gen Supraglottic Airway  Intra-op Monitoring Plan: Standard ASA Monitors  Post Op Pain Control Plan: multimodal analgesia and IV/PO Opioids PRN  Induction:  IV  Informed Consent: Informed consent signed with the Patient and all parties understand the risks and agree with anesthesia plan.  All questions answered. Patient consented to blood products? No  ASA Score: 2  Day of Surgery Review of History & Physical: H&P Update referred to the surgeon/provider.    Ready For Surgery From Anesthesia Perspective.     .

## 2025-04-09 ENCOUNTER — LAB VISIT (OUTPATIENT)
Dept: LAB | Facility: HOSPITAL | Age: 35
End: 2025-04-09
Attending: SURGERY
Payer: COMMERCIAL

## 2025-04-09 DIAGNOSIS — Z01.818 PRE-OP TESTING: ICD-10-CM

## 2025-04-09 LAB
ALBUMIN SERPL BCP-MCNC: 3.9 G/DL (ref 3.5–5.2)
ALP SERPL-CCNC: 66 UNIT/L (ref 40–150)
ALT SERPL W/O P-5'-P-CCNC: 11 UNIT/L (ref 10–44)
ANION GAP (OHS): 6 MMOL/L (ref 8–16)
AST SERPL-CCNC: 14 UNIT/L (ref 11–45)
BILIRUB SERPL-MCNC: 0.3 MG/DL (ref 0.1–1)
BUN SERPL-MCNC: 11 MG/DL (ref 6–20)
CALCIUM SERPL-MCNC: 8.9 MG/DL (ref 8.7–10.5)
CHLORIDE SERPL-SCNC: 109 MMOL/L (ref 95–110)
CO2 SERPL-SCNC: 23 MMOL/L (ref 23–29)
CREAT SERPL-MCNC: 0.7 MG/DL (ref 0.5–1.4)
GFR SERPLBLD CREATININE-BSD FMLA CKD-EPI: >60 ML/MIN/1.73/M2
GLUCOSE SERPL-MCNC: 93 MG/DL (ref 70–110)
POTASSIUM SERPL-SCNC: 3.8 MMOL/L (ref 3.5–5.1)
PROT SERPL-MCNC: 7.1 GM/DL (ref 6–8.4)
SODIUM SERPL-SCNC: 138 MMOL/L (ref 136–145)

## 2025-04-09 PROCEDURE — 36415 COLL VENOUS BLD VENIPUNCTURE: CPT

## 2025-04-09 PROCEDURE — 80053 COMPREHEN METABOLIC PANEL: CPT

## 2025-04-10 ENCOUNTER — PATIENT MESSAGE (OUTPATIENT)
Dept: PREADMISSION TESTING | Facility: HOSPITAL | Age: 35
End: 2025-04-10
Payer: COMMERCIAL

## 2025-04-11 ENCOUNTER — HOSPITAL ENCOUNTER (OUTPATIENT)
Facility: HOSPITAL | Age: 35
Discharge: HOME | End: 2025-04-11
Attending: SURGERY | Admitting: SURGERY
Payer: COMMERCIAL

## 2025-04-11 ENCOUNTER — ANESTHESIA (OUTPATIENT)
Dept: SURGERY | Facility: HOSPITAL | Age: 35
End: 2025-04-11
Payer: COMMERCIAL

## 2025-04-11 VITALS
SYSTOLIC BLOOD PRESSURE: 94 MMHG | TEMPERATURE: 98 F | DIASTOLIC BLOOD PRESSURE: 61 MMHG | HEIGHT: 63 IN | BODY MASS INDEX: 21.48 KG/M2 | OXYGEN SATURATION: 100 % | HEART RATE: 64 BPM | RESPIRATION RATE: 16 BRPM | WEIGHT: 121.25 LBS

## 2025-04-11 DIAGNOSIS — D24.2 FIBROADENOMA OF BOTH BREASTS: Primary | ICD-10-CM

## 2025-04-11 DIAGNOSIS — D24.1 FIBROADENOMA OF BOTH BREASTS: Primary | ICD-10-CM

## 2025-04-11 DIAGNOSIS — N63.11 MASS OF UPPER OUTER QUADRANT OF RIGHT BREAST: ICD-10-CM

## 2025-04-11 DIAGNOSIS — N63.10 BREAST MASS, RIGHT: ICD-10-CM

## 2025-04-11 LAB
B-HCG UR QL: NEGATIVE
CTP QC/QA: YES

## 2025-04-11 PROCEDURE — 37000009 HC ANESTHESIA EA ADD 15 MINS: Performed by: SURGERY

## 2025-04-11 PROCEDURE — 19120 REMOVAL OF BREAST LESION: CPT | Mod: RT,,, | Performed by: SURGERY

## 2025-04-11 PROCEDURE — 63600175 PHARM REV CODE 636 W HCPCS: Performed by: ANESTHESIOLOGY

## 2025-04-11 PROCEDURE — 36000707: Performed by: SURGERY

## 2025-04-11 PROCEDURE — 37000008 HC ANESTHESIA 1ST 15 MINUTES: Performed by: SURGERY

## 2025-04-11 PROCEDURE — 63600175 PHARM REV CODE 636 W HCPCS: Performed by: SURGERY

## 2025-04-11 PROCEDURE — 63600175 PHARM REV CODE 636 W HCPCS: Performed by: NURSE ANESTHETIST, CERTIFIED REGISTERED

## 2025-04-11 PROCEDURE — 25000003 PHARM REV CODE 250: Performed by: NURSE ANESTHETIST, CERTIFIED REGISTERED

## 2025-04-11 PROCEDURE — 71000033 HC RECOVERY, INTIAL HOUR: Performed by: SURGERY

## 2025-04-11 PROCEDURE — 36000706: Performed by: SURGERY

## 2025-04-11 PROCEDURE — 81025 URINE PREGNANCY TEST: CPT | Performed by: SURGERY

## 2025-04-11 PROCEDURE — 71000015 HC POSTOP RECOV 1ST HR: Performed by: SURGERY

## 2025-04-11 PROCEDURE — 88307 TISSUE EXAM BY PATHOLOGIST: CPT | Mod: TC | Performed by: SURGERY

## 2025-04-11 RX ORDER — BUPIVACAINE HYDROCHLORIDE 2.5 MG/ML
INJECTION, SOLUTION EPIDURAL; INFILTRATION; INTRACAUDAL; PERINEURAL
Status: DISCONTINUED
Start: 2025-04-11 | End: 2025-04-11 | Stop reason: HOSPADM

## 2025-04-11 RX ORDER — BUPIVACAINE HYDROCHLORIDE 2.5 MG/ML
INJECTION, SOLUTION EPIDURAL; INFILTRATION; INTRACAUDAL; PERINEURAL
Status: DISCONTINUED | OUTPATIENT
Start: 2025-04-11 | End: 2025-04-11 | Stop reason: HOSPADM

## 2025-04-11 RX ORDER — HYDROCODONE BITARTRATE AND ACETAMINOPHEN 5; 325 MG/1; MG/1
1 TABLET ORAL EVERY 6 HOURS PRN
Qty: 10 TABLET | Refills: 0 | Status: SHIPPED | OUTPATIENT
Start: 2025-04-11

## 2025-04-11 RX ORDER — LIDOCAINE HYDROCHLORIDE 10 MG/ML
1 INJECTION, SOLUTION EPIDURAL; INFILTRATION; INTRACAUDAL; PERINEURAL ONCE
Status: DISCONTINUED | OUTPATIENT
Start: 2025-04-11 | End: 2025-04-11 | Stop reason: HOSPADM

## 2025-04-11 RX ORDER — SODIUM CHLORIDE 9 MG/ML
INJECTION, SOLUTION INTRAVENOUS CONTINUOUS
Status: DISCONTINUED | OUTPATIENT
Start: 2025-04-11 | End: 2025-04-11 | Stop reason: HOSPADM

## 2025-04-11 RX ORDER — IBUPROFEN 800 MG/1
800 TABLET ORAL 3 TIMES DAILY PRN
Qty: 30 TABLET | Refills: 0 | Status: SHIPPED | OUTPATIENT
Start: 2025-04-11

## 2025-04-11 RX ORDER — ONDANSETRON HYDROCHLORIDE 2 MG/ML
INJECTION, SOLUTION INTRAMUSCULAR; INTRAVENOUS
Status: DISCONTINUED | OUTPATIENT
Start: 2025-04-11 | End: 2025-04-11

## 2025-04-11 RX ORDER — HYDROCODONE BITARTRATE AND ACETAMINOPHEN 10; 325 MG/1; MG/1
1 TABLET ORAL EVERY 4 HOURS PRN
Status: DISCONTINUED | OUTPATIENT
Start: 2025-04-11 | End: 2025-04-11 | Stop reason: HOSPADM

## 2025-04-11 RX ORDER — GLUCAGON 1 MG
1 KIT INJECTION
Status: DISCONTINUED | OUTPATIENT
Start: 2025-04-11 | End: 2025-04-11 | Stop reason: HOSPADM

## 2025-04-11 RX ORDER — DEXMEDETOMIDINE HYDROCHLORIDE 100 UG/ML
INJECTION, SOLUTION INTRAVENOUS
Status: DISCONTINUED | OUTPATIENT
Start: 2025-04-11 | End: 2025-04-11

## 2025-04-11 RX ORDER — HYDROCODONE BITARTRATE AND ACETAMINOPHEN 5; 325 MG/1; MG/1
1 TABLET ORAL EVERY 4 HOURS PRN
Status: DISCONTINUED | OUTPATIENT
Start: 2025-04-11 | End: 2025-04-11 | Stop reason: HOSPADM

## 2025-04-11 RX ORDER — ACETAMINOPHEN 10 MG/ML
INJECTION, SOLUTION INTRAVENOUS
Status: DISCONTINUED | OUTPATIENT
Start: 2025-04-11 | End: 2025-04-11

## 2025-04-11 RX ORDER — FENTANYL CITRATE 50 UG/ML
INJECTION, SOLUTION INTRAMUSCULAR; INTRAVENOUS
Status: DISCONTINUED | OUTPATIENT
Start: 2025-04-11 | End: 2025-04-11

## 2025-04-11 RX ORDER — LIDOCAINE HYDROCHLORIDE 20 MG/ML
INJECTION, SOLUTION EPIDURAL; INFILTRATION; INTRACAUDAL; PERINEURAL
Status: DISCONTINUED | OUTPATIENT
Start: 2025-04-11 | End: 2025-04-11

## 2025-04-11 RX ORDER — ONDANSETRON HYDROCHLORIDE 2 MG/ML
4 INJECTION, SOLUTION INTRAVENOUS EVERY 12 HOURS PRN
Status: DISCONTINUED | OUTPATIENT
Start: 2025-04-11 | End: 2025-04-11 | Stop reason: HOSPADM

## 2025-04-11 RX ORDER — DEXAMETHASONE SODIUM PHOSPHATE 4 MG/ML
INJECTION, SOLUTION INTRA-ARTICULAR; INTRALESIONAL; INTRAMUSCULAR; INTRAVENOUS; SOFT TISSUE
Status: DISCONTINUED | OUTPATIENT
Start: 2025-04-11 | End: 2025-04-11

## 2025-04-11 RX ORDER — FENTANYL CITRATE 50 UG/ML
25 INJECTION, SOLUTION INTRAMUSCULAR; INTRAVENOUS EVERY 5 MIN PRN
Status: DISCONTINUED | OUTPATIENT
Start: 2025-04-11 | End: 2025-04-11 | Stop reason: HOSPADM

## 2025-04-11 RX ORDER — ONDANSETRON HYDROCHLORIDE 2 MG/ML
4 INJECTION, SOLUTION INTRAVENOUS DAILY PRN
Status: DISCONTINUED | OUTPATIENT
Start: 2025-04-11 | End: 2025-04-11 | Stop reason: HOSPADM

## 2025-04-11 RX ORDER — PROPOFOL 10 MG/ML
VIAL (ML) INTRAVENOUS
Status: DISCONTINUED | OUTPATIENT
Start: 2025-04-11 | End: 2025-04-11

## 2025-04-11 RX ORDER — LIDOCAINE HYDROCHLORIDE 10 MG/ML
INJECTION, SOLUTION EPIDURAL; INFILTRATION; INTRACAUDAL; PERINEURAL
Status: DISCONTINUED | OUTPATIENT
Start: 2025-04-11 | End: 2025-04-11 | Stop reason: HOSPADM

## 2025-04-11 RX ORDER — MIDAZOLAM HYDROCHLORIDE 1 MG/ML
INJECTION INTRAMUSCULAR; INTRAVENOUS
Status: DISCONTINUED | OUTPATIENT
Start: 2025-04-11 | End: 2025-04-11

## 2025-04-11 RX ORDER — CEFAZOLIN 2 G/1
2 INJECTION, POWDER, FOR SOLUTION INTRAMUSCULAR; INTRAVENOUS
Status: COMPLETED | OUTPATIENT
Start: 2025-04-11 | End: 2025-04-11

## 2025-04-11 RX ORDER — LIDOCAINE HYDROCHLORIDE 10 MG/ML
INJECTION, SOLUTION EPIDURAL; INFILTRATION; INTRACAUDAL; PERINEURAL
Status: DISCONTINUED
Start: 2025-04-11 | End: 2025-04-11 | Stop reason: HOSPADM

## 2025-04-11 RX ADMIN — ACETAMINOPHEN 750 MG: 10 INJECTION, SOLUTION INTRAVENOUS at 07:04

## 2025-04-11 RX ADMIN — DEXMEDETOMIDINE HYDROCHLORIDE 8 MCG: 100 INJECTION, SOLUTION INTRAVENOUS at 07:04

## 2025-04-11 RX ADMIN — PROPOFOL 200 MG: 10 INJECTION, EMULSION INTRAVENOUS at 07:04

## 2025-04-11 RX ADMIN — CEFAZOLIN 2 G: 2 INJECTION, POWDER, FOR SOLUTION INTRAMUSCULAR; INTRAVENOUS at 07:04

## 2025-04-11 RX ADMIN — DEXAMETHASONE SODIUM PHOSPHATE 8 MG: 4 INJECTION, SOLUTION INTRA-ARTICULAR; INTRALESIONAL; INTRAMUSCULAR; INTRAVENOUS; SOFT TISSUE at 07:04

## 2025-04-11 RX ADMIN — LIDOCAINE HYDROCHLORIDE 50 MG: 20 INJECTION, SOLUTION EPIDURAL; INFILTRATION; INTRACAUDAL; PERINEURAL at 07:04

## 2025-04-11 RX ADMIN — ONDANSETRON 4 MG: 2 INJECTION INTRAMUSCULAR; INTRAVENOUS at 07:04

## 2025-04-11 RX ADMIN — FENTANYL CITRATE 25 MCG: 0.05 INJECTION, SOLUTION INTRAMUSCULAR; INTRAVENOUS at 07:04

## 2025-04-11 RX ADMIN — MIDAZOLAM 2 MG: 1 INJECTION INTRAMUSCULAR; INTRAVENOUS at 06:04

## 2025-04-11 RX ADMIN — SODIUM CHLORIDE, SODIUM LACTATE, POTASSIUM CHLORIDE, AND CALCIUM CHLORIDE: 600; 310; 30; 20 INJECTION, SOLUTION INTRAVENOUS at 06:04

## 2025-04-11 NOTE — OP NOTE
The [facility] Periop Services  Surgery Department  Operative Note    SUMMARY     Date of Procedure: 4/11/2025     Procedure:   Right breast excisional biopsy    Surgeons and Role:     * Vaibhav Trimble MD - Primary    Assisting Surgeon: None    Pre-Operative Diagnosis: Mass of upper outer quadrant of right breast [N63.11]    Post-Operative Diagnosis: Post-Op Diagnosis Codes:     * Mass of upper outer quadrant of right breast [N63.11]    Anesthesia: General    Operative Findings (including complications, if any): Right breast excisional biopsy    Description of Technical Procedures:  Right breast mass upper outer quadrant consistent with fibroadenoma in appearance       Estimated Blood Loss (EBL): 5cc           Implants: * No implants in log *    Specimens:   Specimen (24h ago, onward)       Start     Ordered    04/11/25 0724  Specimen to Pathology  RELEASE UPON ORDERING        References:    Click here for ordering Quick Tip   Question:  Release to patient  Answer:  Immediate    04/11/25 0724                   ID Type Source Tests Collected by Time Destination   1 : right breast mass Tissue Breast, Right SPECIMEN TO PATHOLOGY Vaibhav Trimble MD 4/11/2025 0724               Condition: Good    Disposition: PACU - hemodynamically stable.    Procedure in detail:   She was  brought to the Operating Room and placed in the supine position.  General anesthesia was administered.  The bilateral breast, anterior chest, were then prepped and draped in a sterile fashion.     We turned our attention to the right breast. An incision was made in the upper outer quadrant of the right breast.  The specimen was dissected circumferentially around the lesion using palpation as a guide.   The lumpectomy specimen was not oriented and submitted to pathology. The cavity was palpated for any additional findings and none were noted.    Within the lumpectomy cavity, hemostasis was achieved with cautery. The wound was irrigated until clear. There  was no evidence of bleeding. It was closed in multiple layers with deep dermal and subcutaneous interrupted Vicryl sutures and a running 4-0 Monocryl subcuticular skin closure.    Dermabond was applied. Sterile fluff gauze was placed and a post-procedure bra was placed. She tolerated the procedure well without complication and was turned over to Anesthesia for transport to the recovery area in a satisfactory condition. All specimens were sent to Pathology for permanent sectioning.

## 2025-04-11 NOTE — ANESTHESIA PROCEDURE NOTES
Intubation    Date/Time: 4/11/2025 7:03 AM    Performed by: Shawna Uribe CRNA  Authorized by: Sandoval Durham MD    Intubation:     Induction:  Intravenous    Intubated:  Postinduction    Mask Ventilation:  Not attempted    Attempts:  1    Attempted By:  CRNA    Difficult Airway Encountered?: No      Complications:  None    Airway Device:  Supraglottic airway/LMA    Airway Device Size:  3.0    Placement Verified By:  Capnometry    Complicating Factors:  None    Findings Post-Intubation:  BS equal bilateral and atraumatic/condition of teeth unchanged

## 2025-04-11 NOTE — DISCHARGE SUMMARY
The [facility] Services  Discharge Note  Short Stay    Procedure(s) (LRB):  EXCISION, MASS, BREAST (Right)      OUTCOME: Patient tolerated treatment/procedure well without complication and is now ready for discharge.    DISPOSITION: Home or Self Care    FINAL DIAGNOSIS:  Fibroadenoma of both breasts    FOLLOWUP: In clinic    DISCHARGE INSTRUCTIONS:    Discharge Procedure Orders   Diet general     Call MD for:  temperature >100.4     Call MD for:  persistent nausea and vomiting     Call MD for:  severe uncontrolled pain     Call MD for:  difficulty breathing, headache or visual disturbances     Call MD for:  redness, tenderness, or signs of infection (pain, swelling, redness, odor or green/yellow discharge around incision site)     Call MD for:  hives     Call MD for:  persistent dizziness or light-headedness     Call MD for:  extreme fatigue     Remove dressing in 48 hours     Activity as tolerated     Shower on day dressing removed (No bath)        TIME SPENT ON DISCHARGE: 30 minutes

## 2025-04-11 NOTE — DISCHARGE INSTRUCTIONS
POSTOPERATIVE INSTRUCTIONS FOLLOWING BREAST BIOPSY OR LUMPECTOMY    The following are post-operative instructions that will help you to recover from your surgery.  Please read over these instructions carefully and contact us if we can answer any of your questions or concerns.    Dressing/breast binder (surgi-bra)  A surgical bra may be placed around your chest after your surgery.  If you are given the bra, please wear it for the first 48 hours after surgery. After 48 hours you can remove your surgical bra and dressing to shower/cleanse the breast with antibacterial soap and warm water. Do not take a tub bath and do not soak the surgical site for at least 2 weeks. Please do not remove the white strips of tape (steri-strips) that cover your incision- they will be removed at your clinic visit.    The final pathology report will be available approximately 7-10 days after your surgery.  Our office will call you with your pathology report when it becomes available.    Please wear the surgical bra as close to 24 hours a day as possible until your post-operative clinic appointment.  If the elastic around the bra irritates your skin, you may wear a soft t-shirt underneath the bra.     If you have drains: You may shower AFTER the drains are removed.  Please sponge bathe until then.     You may go without wearing the bra long enough to bath, to launder and dry the bra. If you have fluffy filler placed inside the bra, the filler should be removed whenever the bra is taken off. Please reinsert the fluffy filler, or insert the new soft filler, under the bra when you put the bra back on.  If the bra is extremely uncomfortable, you may wear a supportive sports bra instead after 2 days.    Activity   You should be able to return to your regular activities 2 days after your surgery.  However, do not engage in strenuous activities in which you use your upper body such as:  golf, tennis, aerobics, washing windows, raking the yard,  mopping, vacuuming, heavy lifting (e.g children) until you are seen for your follow-up appointment in clinic. Do not lift anything heavier than a gallon of milk.    Medication for pain  You may find that over the counter pain medications may be sufficient for your pain.  You will be given a prescription for pain medication for more severe pain.  You should not drive or operate machinery while taking these.  Please take prescription pain medicine (narcotics) with food.  Narcotics can cause, or worsen, constipation.  You will need to increase your fluid intake, eat high fiber foods (such as fruits and bran) and make sure that you are up and walking. You may need to take an over the counter stool softener for constipation.    Please report the following:  Temperature greater than 101 degrees  Discharge or bad odor from the wound  Excessive bleeding, such as saturated bloody dressing or extreme bruising  Redness at incision and/or drain sites  Swelling or buildup of fluid around incision  Persistent fevers, chills, nausea, vomiting, or diarrhea    Additional information  Your surgeon will see you approximately 2 weeks following your surgery.  If this follow-up appointment has not been made, please call the office.    If you have any questions or problems, please call my office or my nurse.    Dr. Vaibhav Trimble  [phone]    After hours and on weekends, you may call the [facility] at [phone] (toll free).  Our nurse line is available 24/7 for assistance.

## 2025-04-11 NOTE — ANESTHESIA POSTPROCEDURE EVALUATION
Anesthesia Post Evaluation    Patient: Deana Sanchez    Procedure(s) Performed: Procedure(s) (LRB):  EXCISION, MASS, BREAST (Right)    Final Anesthesia Type: general      Patient location during evaluation: PACU  Patient participation: Yes- Able to Participate  Level of consciousness: awake  Post-procedure vital signs: reviewed and stable  Pain management: adequate  Airway patency: patent    PONV status at discharge: No PONV  Anesthetic complications: no      Cardiovascular status: stable  Respiratory status: unassisted  Hydration status: euvolemic  Follow-up not needed.              Vitals Value Taken Time   BP 93/61 04/11/25 08:32   Temp 36.6 °C (97.9 °F) 04/11/25 07:42   Pulse 89 04/11/25 08:35   Resp 41 04/11/25 08:35   SpO2 97 % 04/11/25 08:35   Vitals shown include unfiled device data.      No case tracking events are documented in the log.      Pain/Lavern Score: Lavern Score: 9 (4/11/2025  8:30 AM)

## 2025-04-11 NOTE — TRANSFER OF CARE
"Anesthesia Transfer of Care Note    Patient: Deana Sanchez    Procedure(s) Performed: Procedure(s) (LRB):  EXCISION, MASS, BREAST (Right)    Patient location: PACU    Anesthesia Type: general    Transport from OR: Transported from OR on room air with adequate spontaneous ventilation    Post pain: adequate analgesia    Post assessment: no apparent anesthetic complications    Post vital signs: stable    Level of consciousness: awake    Nausea/Vomiting: no nausea/vomiting    Complications: epistaxis    Transfer of care protocol was followed      Last vitals: Visit Vitals  /60 (BP Location: Right arm, Patient Position: Sitting)   Pulse 81   Temp 36.8 °C (98.2 °F) (Temporal)   Resp 18   Ht 5' 3" (1.6 m)   Wt 55 kg (121 lb 4.1 oz)   LMP 03/25/2025   SpO2 98%   Breastfeeding No   BMI 21.48 kg/m²     "

## 2025-04-15 LAB
ESTROGEN SERPL-MCNC: NORMAL PG/ML
INSULIN SERPL-ACNC: NORMAL U[IU]/ML
LAB AP CLINICAL INFORMATION: NORMAL
LAB AP GROSS DESCRIPTION: NORMAL
LAB AP PERFORMING LOCATION(S): NORMAL
LAB AP REPORT FOOTNOTES: NORMAL

## 2025-04-16 ENCOUNTER — PATIENT MESSAGE (OUTPATIENT)
Dept: SURGERY | Facility: CLINIC | Age: 35
End: 2025-04-16
Payer: COMMERCIAL

## 2025-04-17 ENCOUNTER — TELEPHONE (OUTPATIENT)
Dept: SURGERY | Facility: CLINIC | Age: 35
End: 2025-04-17
Payer: COMMERCIAL

## 2025-04-17 NOTE — TELEPHONE ENCOUNTER
----- Message from Orlin sent at 4/17/2025  9:58 AM CDT -----  Contact: self  Type:  Needs Medical AdviceWho Called: Deana Royould the patient rather a call back or a response via [facility]? Call Sharmila Call Back Number: [phone] Information: the patient coughed up mucus one time and it was black/red. She was wondering if she should be concerned or if that is okay. Yesterday around 8pm is when it happened.

## 2025-04-30 ENCOUNTER — OFFICE VISIT (OUTPATIENT)
Dept: SURGERY | Facility: CLINIC | Age: 35
End: 2025-04-30
Payer: COMMERCIAL

## 2025-04-30 VITALS
HEIGHT: 63 IN | WEIGHT: 122.13 LBS | SYSTOLIC BLOOD PRESSURE: 98 MMHG | DIASTOLIC BLOOD PRESSURE: 68 MMHG | BODY MASS INDEX: 21.64 KG/M2 | HEART RATE: 92 BPM

## 2025-04-30 DIAGNOSIS — N63.11 MASS OF UPPER OUTER QUADRANT OF RIGHT BREAST: Primary | ICD-10-CM

## 2025-04-30 DIAGNOSIS — D24.2 FIBROADENOMA OF BOTH BREASTS: ICD-10-CM

## 2025-04-30 DIAGNOSIS — D24.1 FIBROADENOMA OF BOTH BREASTS: ICD-10-CM

## 2025-04-30 PROCEDURE — 3078F DIAST BP <80 MM HG: CPT | Mod: CPTII,S$GLB,,

## 2025-04-30 PROCEDURE — 1160F RVW MEDS BY RX/DR IN RCRD: CPT | Mod: CPTII,S$GLB,,

## 2025-04-30 PROCEDURE — 99999 PR PBB SHADOW E&M-EST. PATIENT-LVL III: CPT | Mod: PBBFAC,,,

## 2025-04-30 PROCEDURE — 99024 POSTOP FOLLOW-UP VISIT: CPT | Mod: S$GLB,,,

## 2025-04-30 PROCEDURE — 1159F MED LIST DOCD IN RCRD: CPT | Mod: CPTII,S$GLB,,

## 2025-04-30 PROCEDURE — 3074F SYST BP LT 130 MM HG: CPT | Mod: CPTII,S$GLB,,

## 2025-04-30 NOTE — PROGRESS NOTES
Breast Surgical Oncology  Jesse    Date of Service: 04/30/2025    SUBJECTIVE:   Chief complaint:  Painful right breast mass    HISTORY OF PRESENT ILLNESS:   Deana Sanchez is a 34 y.o. female who is kindly referred by Dr. Isidro Acosta for an enlarging right breast mass.      12/1/22  She has had bilateral breast masses for several years with the radiologic appearance of small fibroadenomas which have been stable since 2016. However, recent diagnostic imaging revealed a new right breast mass at 9:00 5 CMFN measuring 3.5 cm in maximal dimension. The mass causes pain during her menstrual cycles. She is asymptomatic from the remaining bilateral breast masses. She denies breast concerns such as skin changes, nipple discharge, nipple retraction or lumps under the arm.     5/11/23  She underwent excisional biopsy of her right breast mass at 9:00, 5 CMFN which was a benign fibroadenoma. She is here today for 6 month surveillance of her remaining fibroadenomas which have been stable since 2016. She reports a new palpable mass in her right breast axillary tail. This was noted a few weeks ago.      11/21/23  She is here for imaging observation of her right upper outer quadrant mass. Her retro-areolar benign fibroadenomas have remained stable since 2016. She reports soreness to her UOQ during menses. She denies breast concerns such as pain, new masses, skin changes, nipple discharge, nipple retraction or lumps under the arm.     04/02/2025  She presents today due to continued discomfort of right upper outer quadrant breast mass. Becomes more painful during menstrual cycle. Denies pain at other areas.     04/30/25  Patient is now s/p right breast mass excisional biopsy who is recovering as expected. She denies any pain or issues with the incision. She is tolerating a regular diet and having normal bowel movements. She denies fevers, chills, nausea, and vomiting.     Her breast cancer risk factor profile is as  follows: Menarche at 16, Menopause at N/A.  She is . Family history of cancer is as follows: paternal Aunt with breast cancer at age 58; currently living.    FAMILY HISTORY:     Family History   Problem Relation Name Age of Onset    Diabetes type II Mother      Heart attack Father      Breast cancer Maternal Aunt          PAST MEDICAL HISTORY:     Past Medical History:   Diagnosis Date    Mass of upper outer quadrant of right breast 2025    Unspecified lump in the right breast, upper outer quadrant 2023       SURGICAL HISTORY:     Past Surgical History:   Procedure Laterality Date    ANKLE SURGERY      BREAST MASS EXCISION Right 2022    Procedure: EXCISION, MASS, BREAST;  Surgeon: Naheed Patel MD;  Location: [facility] OR;  Service: General;  Laterality: Right;  intraoperative needle localization, need US    BREAST MASS EXCISION Right 2025    Procedure: EXCISION, MASS, BREAST;  Surgeon: Vaibhav Trimble MD;  Location: [facility] OR;  Service: General;  Laterality: Right;    KNEE SURGERY      MANDIBLE SURGERY      NEEDLE LOCALIZATION Right 2022    Procedure: NEEDLE LOCALIZATION;  Surgeon: Naheed Patel MD;  Location: [facility] OR;  Service: General;  Laterality: Right;  intra-op       SOCIAL HISTORY:     Social History     Tobacco Use    Smoking status: Some Days     Current packs/day: 1.00     Types: Cigarettes    Smokeless tobacco: Never    Tobacco comments:     working on quitting   Substance Use Topics    Alcohol use: Yes     Alcohol/week: 1.0 standard drink of alcohol     Types: 1 Standard drinks or equivalent per week    Drug use: No      Reports 2 packs per week tobacco use    MEDICATIONS/ALLERGIES:     Current Outpatient Medications:     multivit-min/iron/folic acid/K (ADULTS MULTIVITAMIN ORAL), Take by mouth., Disp: , Rfl:   No current facility-administered medications for this visit.  Review of patient's allergies indicates:   Allergen Reactions    Morphine Other (See Comments)      aggression       REVIEW OF SYSTEMS:   I have reviewed 12 systems, including 2 points per system. Pertinent reported positives are: none    PHYSICAL EXAM:   General: The patient appears well and is in no acute distress.   Vitals:    04/30/25 1121   BP: 98/68   Pulse: 92       BREAST EXAM  No Asymmetry  Right:  - Mass: Right upper outer quadrant excision site CDI, no SOI. No evidence of seroma or hematoma. Healing well.   - Skin change: No  - Nipple Discharge: No  - Nipple retraction: No  - Axillary LAD: No  Left:   - Mass: No  - Skin change: No  - Nipple Discharge: No  - Nipple retraction: No  - Axillary LAD: No      IMAGING:       Results for orders placed during the hospital encounter of 11/10/22    Mammo Digital Diagnostic Bilat with Aman    Narrative  Result:  Mammo Digital Diagnostic Bilat with Aman  US Breast Bilateral Limited    History:  Patient is 32 y.o. and is seen for diagnostic imaging.    Films Compared:  Compared to: 06/14/2016 US Breast Bilateral Limited, 12/17/2015 US Breast Left Limited, and 12/08/2015 US Breast Right Limited    Findings:  This procedure was performed using tomosynthesis. Computer-aided detection was utilized in the interpretation of this examination.  The breasts are heterogeneously dense, which may obscure small masses.    Left  Mammo Digital Diagnostic Bilat with Aman  There are 2 similar masses seen in the lower outer quadrant of the left breast.    There is no evidence of suspicious masses, calcifications, or other abnormal findings in the left breast.    US Breast Bilateral Limited  There is an 8 mm x 10 mm x 4 mm oval, parallel, hypoechoic mass seen in the lower outer quadrant of the left breast. Additional parallel hypoechoic lesion at 06:00 o'clock measuring 10 mm x 4 mm x 12 mm.  Both these lesions are most consistent with fibroadenomas and are stable when compared to the study from 2016.    There is no evidence of suspicious masses or other abnormal findings in the left  breast.    Right  Mammo Digital Diagnostic Bilat with Aman  There is a mass seen in the outer region of the right breast.    There are 2 similar masses seen in the right breast.    US Breast Bilateral Limited  There is a 35 mm x 17 mm x 33 mm oval, parallel, hypoechoic mass with posterior enhancement seen in the right breast at 9 o'clock. Finding most likely representative of a fibroadenoma although due to the size tissue sampling versus surgical resection is recommended.  This lesion was not imaged on prior studies from 2016.  No axillary adenopathy.    There is a 24 mm x 5 mm x 10 mm mass seen in the retroareolar region of the right breast. Additional subareolar mass measures 14 x 6 by 11 mm.  Both of these lesions are stable when compared to 2016 and are most consistent with fibroadenomas.    Impression  Left  Mammo Digital Diagnostic Bilat with Aman  There is no mammographic evidence of malignancy in the left breast.    US Breast Bilateral Limited  There is no sonographic evidence of malignancy in the left breast.    Right  Mass: Right breast mass at the outer position. Assessment: 4A - Suspicious finding. Ultrasound-guided biopsy is recommended.    BI-RADS Category:  Overall: 4 - Suspicious      Recommendation:    Ultrasound-guided biopsy is recommended.        Your estimated lifetime risk of breast cancer (to age 85) based on Tyrer-Cuzick risk assessment model is Tyrer-Cuzick: 11.68 %. According to the [employer], patients with a lifetime breast cancer risk of 20% or higher might benefit from supplemental screening tests.      PATHOLOGY:     Lab Results   Component Value Date    FPATHDX  12/06/2022     Right breast, 9 o'clock position, excisional biopsy:  Benign fibroadenoma, completely excised         Final Diagnosis   Right breast mass, excision: 04/11/2025  Benign fibroadenoma, 3.0 cm in greatest extent.  Negative for atypical hyperplasia and malignancy.    The neoplasm extends to the inked  margin of resection.  Microcalcifications are not identified.     ASSESSMENT:     1. Mass of upper outer quadrant of right breast    2. Fibroadenoma of both breasts        PLAN:     33 y/o female who has recovered well s/p right breast mass excisional biopsy with benign pathology as above.    - No further post-operative interventions or restrictions  - No need for routine further imaging at this time. Patient will start annual screening mammography at age 41.  - Advised to call with any new breast symptoms such as new masses, new pain, nipple dimpling, nipple discharge, or skin changes.  - RTC as needed or if any issues arise.      Marlyn Morin PA-C  [facility]

## (undated) DEVICE — KIT TURNOVER

## (undated) DEVICE — GAUZE SPONGE BULKEE 6X6.75IN

## (undated) DEVICE — SUT 2/0 30IN SILK BLK BRAI

## (undated) DEVICE — COVER LIGHT HANDLE 80/CA

## (undated) DEVICE — NDL SAFETY 22G X 1.5 ECLIPSE

## (undated) DEVICE — SUT MONOCRYL 4-0 PS-1 UND

## (undated) DEVICE — ELECTRODE REM PLYHSV RETURN 9

## (undated) DEVICE — SYR 10CC LUER LOCK

## (undated) DEVICE — SKIN MARKER DEVON 160

## (undated) DEVICE — SUPPORT ULNA NERVE PROTECTOR

## (undated) DEVICE — PACK BASIC SETUP SC BR

## (undated) DEVICE — PACK DRAPE UNIVERSAL CONVERTOR

## (undated) DEVICE — GLOVE SURGICAL LATEX SZ 7

## (undated) DEVICE — GLOVE BIOGEL ECLIPSE SZ 6.5

## (undated) DEVICE — COVER CAMERA OPERATING ROOM

## (undated) DEVICE — SUT VICRYL 4-0 RB1 27IN UD

## (undated) DEVICE — CONTAINER SPECIMEN OR STER 4OZ

## (undated) DEVICE — MANIFOLD 4 PORT

## (undated) DEVICE — SHEATH GUIDE SCOUT SURG RADAR

## (undated) DEVICE — POSITIONER HEAD DONUT 9IN FOAM

## (undated) DEVICE — SUT MONOCYRL 4-0 PS2 UND

## (undated) DEVICE — DRAPE THREE-QTR REINF 53X77IN

## (undated) DEVICE — APPLICATOR CHLORAPREP ORN 26ML

## (undated) DEVICE — SUT VICRYL 3-0 27 SH

## (undated) DEVICE — DRAPE UTILITY W/ TAPE 20X30IN

## (undated) DEVICE — WAVEGUIDE BRITEFIELD DISP

## (undated) DEVICE — UNDERGLOVES BIOGEL PI SZ 7 LF

## (undated) DEVICE — NDL SAFETY 25G X 1.5 ECLIPSE

## (undated) DEVICE — SPONGE GAUZE 16PLY 4X4

## (undated) DEVICE — SOL 9P NACL IRR PIC IL

## (undated) DEVICE — GOWN POLY REINF BRTH SLV XL

## (undated) DEVICE — DRAPE CHEST FEN 15X10IN

## (undated) DEVICE — DRAPE ULTRASOUND PROBE HEAD

## (undated) DEVICE — PAD ABD 8X10 STERILE

## (undated) DEVICE — STRIP MEDI WND CLSR 1/2X4IN

## (undated) DEVICE — DRAPE SURG W/TWL 17 5/8X23

## (undated) DEVICE — BLADE SURG #15 CARBON STEEL

## (undated) DEVICE — ADHESIVE DERMABOND ADVANCED

## (undated) DEVICE — TOWEL OR DISP STRL BLUE 4/PK

## (undated) DEVICE — HANDSWITCH SUCTION COAG

## (undated) DEVICE — GEL AQUASONIC 100 STERILE20GM

## (undated) DEVICE — CLIP LIGATING TITANIUM SMALL

## (undated) DEVICE — ADHESIVE MASTISOL VIAL 48/BX